# Patient Record
Sex: MALE | Race: WHITE | HISPANIC OR LATINO | Employment: OTHER | URBAN - METROPOLITAN AREA
[De-identification: names, ages, dates, MRNs, and addresses within clinical notes are randomized per-mention and may not be internally consistent; named-entity substitution may affect disease eponyms.]

---

## 2023-04-10 ENCOUNTER — APPOINTMENT (OUTPATIENT)
Dept: PRIMARY CARE | Facility: CLINIC | Age: 68
End: 2023-04-10
Payer: MEDICARE

## 2023-04-10 PROBLEM — E11.9 CONTROLLED DIABETES MELLITUS TYPE II WITHOUT COMPLICATION (MULTI): Status: ACTIVE | Noted: 2023-04-10

## 2023-04-10 PROBLEM — K22.70 ESOPHAGUS, BARRETT'S: Status: ACTIVE | Noted: 2023-04-10

## 2023-04-10 PROBLEM — H25.13 AGE-RELATED NUCLEAR CATARACT OF BOTH EYES: Status: ACTIVE | Noted: 2023-04-10

## 2023-04-10 PROBLEM — E11.9 DIABETES MELLITUS TYPE 2 WITHOUT RETINOPATHY (MULTI): Status: ACTIVE | Noted: 2023-04-10

## 2023-04-10 PROBLEM — J30.2 SEASONAL ALLERGIES: Status: ACTIVE | Noted: 2023-04-10

## 2023-04-10 PROBLEM — E11.9 DIABETES MELLITUS (MULTI): Status: ACTIVE | Noted: 2023-04-10

## 2023-04-10 PROBLEM — K21.9 GERD (GASTROESOPHAGEAL REFLUX DISEASE): Status: ACTIVE | Noted: 2023-04-10

## 2023-04-10 PROBLEM — H52.4 BILATERAL PRESBYOPIA: Status: ACTIVE | Noted: 2023-04-10

## 2023-04-10 PROBLEM — I10 BENIGN ESSENTIAL HYPERTENSION: Status: ACTIVE | Noted: 2023-04-10

## 2023-04-10 PROBLEM — E78.00 HIGH CHOLESTEROL: Status: ACTIVE | Noted: 2023-04-10

## 2023-04-10 RX ORDER — LOSARTAN POTASSIUM AND HYDROCHLOROTHIAZIDE 12.5; 1 MG/1; MG/1
TABLET ORAL
COMMUNITY
End: 2023-05-08 | Stop reason: ALTCHOICE

## 2023-04-10 RX ORDER — SIMVASTATIN 20 MG/1
TABLET, FILM COATED ORAL
COMMUNITY
End: 2023-05-08

## 2023-04-10 RX ORDER — LANSOPRAZOLE 30 MG/1
CAPSULE, DELAYED RELEASE ORAL
COMMUNITY
End: 2023-05-08 | Stop reason: ALTCHOICE

## 2023-04-10 RX ORDER — NEOMYCIN/POLYMYXIN B/PRAMOXINE 3.5-10K-1
CREAM (GRAM) TOPICAL
COMMUNITY
End: 2023-05-08 | Stop reason: ALTCHOICE

## 2023-04-10 RX ORDER — TESTOSTERONE CYPIONATE 1000 MG/10ML
INJECTION, SOLUTION INTRAMUSCULAR
COMMUNITY
End: 2023-05-08 | Stop reason: ALTCHOICE

## 2023-04-10 RX ORDER — LIRAGLUTIDE 6 MG/ML
INJECTION SUBCUTANEOUS
COMMUNITY
End: 2023-05-08 | Stop reason: ALTCHOICE

## 2023-04-10 RX ORDER — METFORMIN HYDROCHLORIDE EXTENDED-RELEASE TABLETS 1000 MG/1
TABLET, FILM COATED, EXTENDED RELEASE ORAL
COMMUNITY
End: 2023-05-08 | Stop reason: SDUPTHER

## 2023-04-10 RX ORDER — PROPRANOLOL HYDROCHLORIDE 40 MG/1
TABLET ORAL
COMMUNITY
End: 2023-05-08 | Stop reason: SDUPTHER

## 2023-05-08 ENCOUNTER — OFFICE VISIT (OUTPATIENT)
Dept: PRIMARY CARE | Facility: CLINIC | Age: 68
End: 2023-05-08
Payer: MEDICARE

## 2023-05-08 VITALS
SYSTOLIC BLOOD PRESSURE: 125 MMHG | RESPIRATION RATE: 16 BRPM | OXYGEN SATURATION: 97 % | HEART RATE: 82 BPM | DIASTOLIC BLOOD PRESSURE: 86 MMHG

## 2023-05-08 DIAGNOSIS — K22.70 BARRETT'S ESOPHAGUS WITHOUT DYSPLASIA: ICD-10-CM

## 2023-05-08 DIAGNOSIS — N40.0 BENIGN PROSTATIC HYPERPLASIA WITHOUT LOWER URINARY TRACT SYMPTOMS: ICD-10-CM

## 2023-05-08 DIAGNOSIS — E78.00 HIGH CHOLESTEROL: ICD-10-CM

## 2023-05-08 DIAGNOSIS — I10 BENIGN ESSENTIAL HYPERTENSION: Primary | ICD-10-CM

## 2023-05-08 DIAGNOSIS — E11.9 CONTROLLED TYPE 2 DIABETES MELLITUS WITHOUT COMPLICATION, WITHOUT LONG-TERM CURRENT USE OF INSULIN (MULTI): ICD-10-CM

## 2023-05-08 PROBLEM — K80.50 CHOLEDOCHOLITHIASIS: Status: RESOLVED | Noted: 2017-09-25 | Resolved: 2023-05-08

## 2023-05-08 PROBLEM — K80.20 CHOLELITHIASIS: Status: RESOLVED | Noted: 2017-09-14 | Resolved: 2023-05-08

## 2023-05-08 PROCEDURE — 4010F ACE/ARB THERAPY RXD/TAKEN: CPT | Performed by: INTERNAL MEDICINE

## 2023-05-08 PROCEDURE — 1160F RVW MEDS BY RX/DR IN RCRD: CPT | Performed by: INTERNAL MEDICINE

## 2023-05-08 PROCEDURE — 1036F TOBACCO NON-USER: CPT | Performed by: INTERNAL MEDICINE

## 2023-05-08 PROCEDURE — 3079F DIAST BP 80-89 MM HG: CPT | Performed by: INTERNAL MEDICINE

## 2023-05-08 PROCEDURE — 99204 OFFICE O/P NEW MOD 45 MIN: CPT | Performed by: INTERNAL MEDICINE

## 2023-05-08 PROCEDURE — 1159F MED LIST DOCD IN RCRD: CPT | Performed by: INTERNAL MEDICINE

## 2023-05-08 PROCEDURE — 3074F SYST BP LT 130 MM HG: CPT | Performed by: INTERNAL MEDICINE

## 2023-05-08 RX ORDER — ALFUZOSIN HYDROCHLORIDE 10 MG/1
10 TABLET, EXTENDED RELEASE ORAL NIGHTLY
COMMUNITY

## 2023-05-08 RX ORDER — DAPAGLIFLOZIN 10 MG/1
10 TABLET, FILM COATED ORAL DAILY
COMMUNITY
Start: 2023-01-11 | End: 2024-05-28 | Stop reason: SDUPTHER

## 2023-05-08 RX ORDER — SEMAGLUTIDE 1.34 MG/ML
2 INJECTION, SOLUTION SUBCUTANEOUS
COMMUNITY
Start: 2022-11-07 | End: 2023-10-06 | Stop reason: SDUPTHER

## 2023-05-08 RX ORDER — METFORMIN HYDROCHLORIDE 500 MG/1
1000 TABLET, EXTENDED RELEASE ORAL
COMMUNITY
Start: 2023-02-02 | End: 2024-05-28 | Stop reason: SDUPTHER

## 2023-05-08 RX ORDER — LOSARTAN POTASSIUM 50 MG/1
50 TABLET ORAL DAILY
COMMUNITY
Start: 2023-05-04

## 2023-05-08 RX ORDER — PROPRANOLOL HYDROCHLORIDE 40 MG/1
40 TABLET ORAL 2 TIMES DAILY
COMMUNITY
Start: 2016-04-04 | End: 2024-06-03

## 2023-05-08 RX ORDER — SIMVASTATIN 40 MG/1
TABLET, FILM COATED ORAL
COMMUNITY
End: 2023-10-06 | Stop reason: SDUPTHER

## 2023-05-08 RX ORDER — CALC/MAG/B COMPLEX/D3/HERB 61
15 TABLET ORAL DAILY
COMMUNITY

## 2023-05-08 RX ORDER — ASPIRIN 81 MG/1
81 TABLET ORAL
COMMUNITY
Start: 2016-04-04 | End: 2024-01-22

## 2023-05-08 RX ORDER — PIOGLITAZONEHYDROCHLORIDE 15 MG/1
15 TABLET ORAL DAILY
COMMUNITY
Start: 2023-04-10 | End: 2023-10-06 | Stop reason: SDUPTHER

## 2023-05-08 ASSESSMENT — ENCOUNTER SYMPTOMS
ABDOMINAL PAIN: 0
SHORTNESS OF BREATH: 0
APPETITE CHANGE: 0
POLYPHAGIA: 0
POLYDIPSIA: 0
DECREASED CONCENTRATION: 0
ACTIVITY CHANGE: 0
COLOR CHANGE: 0
HEADACHES: 0
NERVOUS/ANXIOUS: 0
FEVER: 0
BRUISES/BLEEDS EASILY: 0
NAUSEA: 0
MYALGIAS: 0
RHINORRHEA: 0
JOINT SWELLING: 0
BACK PAIN: 0
HEMATURIA: 0
SLEEP DISTURBANCE: 0
DIAPHORESIS: 0
NUMBNESS: 0
SINUS PRESSURE: 0
FATIGUE: 0
CONSTIPATION: 0
PALPITATIONS: 0
WHEEZING: 0
ADENOPATHY: 0
DIZZINESS: 0
LIGHT-HEADEDNESS: 0
NECK STIFFNESS: 0
EYE ITCHING: 0
ARTHRALGIAS: 0
DIARRHEA: 0
VOMITING: 0
SORE THROAT: 0
DYSURIA: 0
COUGH: 0
WEAKNESS: 0
DYSPHORIC MOOD: 0
CHEST TIGHTNESS: 0
FREQUENCY: 0

## 2023-05-08 NOTE — PROGRESS NOTES
Jakob Dyer comes in today to establish with a new PCP.      Ms. Dyer comes in today to establish with a new primary care physician.  He is followed by specialists for diabetes, BPH, and Ortiz's esophagus.  He has remained quite stable and feels quite well.  Lab work was recently updated.  He was previously followed by an in-house physician as his place of employment, needing now to establish with a new PCP as he has retired.  He is uncertain when he has had a last technical physical, but again just had blood work done and has follow-up with his specialist upcoming, so he would like to defer this until the end of the year.  He feels well.  He denies any headaches, dizziness, chest pain, palpitations, shortness of breath nor cough, nausea, vomiting, nor changes in bowel nor bladder habits.  Again, he follows closely with specialist, his levels being well managed.  He tries to stay active, traveling, and exercising routinely.  He denies any problems to bring up at this time.        Review of Systems   Constitutional:  Negative for activity change, appetite change, diaphoresis, fatigue and fever.   HENT:  Negative for congestion, ear pain, rhinorrhea, sinus pressure, sore throat and tinnitus.    Eyes:  Negative for itching and visual disturbance.   Respiratory:  Negative for cough, chest tightness, shortness of breath and wheezing.    Cardiovascular:  Negative for chest pain, palpitations and leg swelling.   Gastrointestinal:  Negative for abdominal pain, constipation, diarrhea, nausea and vomiting.   Endocrine: Negative for cold intolerance, polydipsia, polyphagia and polyuria.   Genitourinary:  Negative for dysuria, frequency, hematuria, testicular pain and urgency.   Musculoskeletal:  Negative for arthralgias, back pain, joint swelling, myalgias and neck stiffness.   Skin:  Negative for color change, pallor and rash.   Allergic/Immunologic: Negative for environmental allergies, food allergies and  immunocompromised state.   Neurological:  Negative for dizziness, syncope, weakness, light-headedness, numbness and headaches.   Hematological:  Negative for adenopathy. Does not bruise/bleed easily.   Psychiatric/Behavioral:  Negative for behavioral problems, decreased concentration, dysphoric mood and sleep disturbance. The patient is not nervous/anxious.        Objective   Physical Exam  Constitutional:       General: He is not in acute distress.     Appearance: Normal appearance. He is not toxic-appearing or diaphoretic.   HENT:      Head: Normocephalic and atraumatic.      Right Ear: Tympanic membrane normal. There is no impacted cerumen.      Left Ear: Tympanic membrane normal. There is no impacted cerumen.   Eyes:      General: No scleral icterus.        Right eye: No discharge.         Left eye: No discharge.      Conjunctiva/sclera: Conjunctivae normal.      Pupils: Pupils are equal, round, and reactive to light.   Neck:      Vascular: No carotid bruit.   Cardiovascular:      Rate and Rhythm: Normal rate and regular rhythm.      Heart sounds: Normal heart sounds. No murmur heard.     No gallop.   Pulmonary:      Effort: Pulmonary effort is normal. No respiratory distress.      Breath sounds: Normal breath sounds. No wheezing, rhonchi or rales.   Abdominal:      General: There is no distension.      Tenderness: There is no abdominal tenderness. There is no guarding or rebound.   Musculoskeletal:         General: No swelling.      Cervical back: No rigidity.      Right lower leg: No edema.      Left lower leg: No edema.   Lymphadenopathy:      Cervical: No cervical adenopathy.   Skin:     Coloration: Skin is not jaundiced.      Findings: No erythema or rash.   Neurological:      General: No focal deficit present.      Mental Status: He is alert. He is disoriented.      Cranial Nerves: No cranial nerve deficit.      Sensory: No sensory deficit.   Psychiatric:         Mood and Affect: Mood normal.          Behavior: Behavior normal.         Thought Content: Thought content normal.         Judgment: Judgment normal.         Assessment/Plan   1.  Diabetes: Follows closely with endocrinologist at an outside facility.  Had lab work done about 1 week ago, plans on following up with him in the next few weeks.  All adjustments on refills on medications are coming through the specialist office.  2.  Hypertension: Has remained outstanding.  Denies any troubles tolerating his losartan.  Had actually decreased his dosing recently because of managed readings.  3.  Mild hyperlipidemia: Continues on statin.  Lab work recently updated.  Follows with endocrinology.  4.  BPH: Stable symptoms on current medications.  Again, follows with urology specialist.  5.  Ortiz's esophagus with GERD: Ortiz's reportedly had essentially been fully treated based on last scope.  Continues on PPI therapy now at a lower dose.    We will plan on seeing him back in the late year for his wellness visit.  If he has any questions prior to that time, he is more than welcome to contact us.    Problem List Items Addressed This Visit    None

## 2023-05-08 NOTE — PATIENT INSTRUCTIONS
It was a pleasure meeting you in the office today.  At this time, your specialists are managing your prescriptions and following your blood work, but we should plan on seeing you back at the end of the year for a comprehensive Medicare wellness visit.  If you have any questions prior to that time, please feel free to contact us.

## 2023-09-04 PROBLEM — K76.0 FATTY LIVER DISEASE, NONALCOHOLIC: Status: ACTIVE | Noted: 2023-09-04

## 2023-09-04 PROBLEM — N52.9 ERECTILE DYSFUNCTION: Status: ACTIVE | Noted: 2023-09-04

## 2023-09-04 PROBLEM — E11.65 TYPE 2 DIABETES MELLITUS WITH HYPERGLYCEMIA (MULTI): Status: ACTIVE | Noted: 2023-09-04

## 2023-09-04 RX ORDER — PEN NEEDLE, DIABETIC 30 GX3/16"
NEEDLE, DISPOSABLE MISCELLANEOUS DAILY
COMMUNITY

## 2023-10-06 DIAGNOSIS — E11.9 DIABETES MELLITUS TYPE 2 WITHOUT RETINOPATHY (MULTI): Primary | ICD-10-CM

## 2023-10-06 RX ORDER — SEMAGLUTIDE 1.34 MG/ML
2 INJECTION, SOLUTION SUBCUTANEOUS
Qty: 18 ML | Refills: 3 | Status: SHIPPED | OUTPATIENT
Start: 2023-10-06 | End: 2023-11-07 | Stop reason: SDUPTHER

## 2023-10-06 RX ORDER — SIMVASTATIN 40 MG/1
TABLET, FILM COATED ORAL
Qty: 90 TABLET | Refills: 1 | Status: SHIPPED | OUTPATIENT
Start: 2023-10-06 | End: 2024-03-29

## 2023-10-06 RX ORDER — PIOGLITAZONEHYDROCHLORIDE 15 MG/1
15 TABLET ORAL DAILY
Qty: 90 TABLET | Refills: 3 | Status: SHIPPED | OUTPATIENT
Start: 2023-10-06 | End: 2024-10-05

## 2023-11-07 ENCOUNTER — TELEPHONE (OUTPATIENT)
Dept: ENDOCRINOLOGY | Facility: CLINIC | Age: 68
End: 2023-11-07
Payer: MEDICARE

## 2023-11-07 DIAGNOSIS — E11.9 DIABETES MELLITUS TYPE 2 WITHOUT RETINOPATHY (MULTI): ICD-10-CM

## 2023-11-07 RX ORDER — SEMAGLUTIDE 1.34 MG/ML
2 INJECTION, SOLUTION SUBCUTANEOUS
Qty: 18 ML | Refills: 3 | Status: SHIPPED | OUTPATIENT
Start: 2023-11-07 | End: 2024-04-22 | Stop reason: WASHOUT

## 2023-11-07 NOTE — TELEPHONE ENCOUNTER
Pt states that he has been having GI problems from Ozempic. He would like to know if there is another Rx like Ozempic that he could take?

## 2023-11-13 ENCOUNTER — OFFICE VISIT (OUTPATIENT)
Dept: PRIMARY CARE | Facility: CLINIC | Age: 68
End: 2023-11-13
Payer: MEDICARE

## 2023-11-13 VITALS
HEIGHT: 72 IN | HEART RATE: 84 BPM | BODY MASS INDEX: 28.61 KG/M2 | SYSTOLIC BLOOD PRESSURE: 95 MMHG | WEIGHT: 211.2 LBS | DIASTOLIC BLOOD PRESSURE: 66 MMHG

## 2023-11-13 DIAGNOSIS — K21.9 GASTROESOPHAGEAL REFLUX DISEASE, UNSPECIFIED WHETHER ESOPHAGITIS PRESENT: ICD-10-CM

## 2023-11-13 DIAGNOSIS — Z00.00 ROUTINE GENERAL MEDICAL EXAMINATION AT HEALTH CARE FACILITY: ICD-10-CM

## 2023-11-13 DIAGNOSIS — Z23 NEED FOR PNEUMOCOCCAL 20-VALENT CONJUGATE VACCINATION: ICD-10-CM

## 2023-11-13 DIAGNOSIS — I10 BENIGN ESSENTIAL HYPERTENSION: ICD-10-CM

## 2023-11-13 DIAGNOSIS — N40.0 BENIGN PROSTATIC HYPERPLASIA WITHOUT LOWER URINARY TRACT SYMPTOMS: ICD-10-CM

## 2023-11-13 DIAGNOSIS — E11.9 CONTROLLED TYPE 2 DIABETES MELLITUS WITHOUT COMPLICATION, WITHOUT LONG-TERM CURRENT USE OF INSULIN (MULTI): ICD-10-CM

## 2023-11-13 DIAGNOSIS — Z12.5 SCREENING FOR MALIGNANT NEOPLASM OF PROSTATE: ICD-10-CM

## 2023-11-13 DIAGNOSIS — Z23 NEED FOR VACCINATION TO PREVENT TUBERCULOSIS: ICD-10-CM

## 2023-11-13 DIAGNOSIS — Z00.00 ROUTINE GENERAL MEDICAL EXAMINATION AT A HEALTH CARE FACILITY: Primary | ICD-10-CM

## 2023-11-13 DIAGNOSIS — Z13.6 ENCOUNTER FOR ABDOMINAL AORTIC ANEURYSM (AAA) SCREENING: ICD-10-CM

## 2023-11-13 DIAGNOSIS — E78.00 HIGH CHOLESTEROL: ICD-10-CM

## 2023-11-13 PROCEDURE — G0009 ADMIN PNEUMOCOCCAL VACCINE: HCPCS | Performed by: INTERNAL MEDICINE

## 2023-11-13 PROCEDURE — 90677 PCV20 VACCINE IM: CPT | Performed by: INTERNAL MEDICINE

## 2023-11-13 PROCEDURE — G0439 PPPS, SUBSEQ VISIT: HCPCS | Performed by: INTERNAL MEDICINE

## 2023-11-13 PROCEDURE — 1170F FXNL STATUS ASSESSED: CPT | Performed by: INTERNAL MEDICINE

## 2023-11-13 PROCEDURE — 3078F DIAST BP <80 MM HG: CPT | Performed by: INTERNAL MEDICINE

## 2023-11-13 PROCEDURE — 1036F TOBACCO NON-USER: CPT | Performed by: INTERNAL MEDICINE

## 2023-11-13 PROCEDURE — 93000 ELECTROCARDIOGRAM COMPLETE: CPT | Performed by: INTERNAL MEDICINE

## 2023-11-13 PROCEDURE — 3074F SYST BP LT 130 MM HG: CPT | Performed by: INTERNAL MEDICINE

## 2023-11-13 PROCEDURE — 4010F ACE/ARB THERAPY RXD/TAKEN: CPT | Performed by: INTERNAL MEDICINE

## 2023-11-13 PROCEDURE — 99214 OFFICE O/P EST MOD 30 MIN: CPT | Performed by: INTERNAL MEDICINE

## 2023-11-13 PROCEDURE — 1159F MED LIST DOCD IN RCRD: CPT | Performed by: INTERNAL MEDICINE

## 2023-11-13 PROCEDURE — 1160F RVW MEDS BY RX/DR IN RCRD: CPT | Performed by: INTERNAL MEDICINE

## 2023-11-13 ASSESSMENT — ENCOUNTER SYMPTOMS
WEAKNESS: 0
HEMATURIA: 0
CHEST TIGHTNESS: 0
OCCASIONAL FEELINGS OF UNSTEADINESS: 0
FEVER: 0
FLANK PAIN: 0
NERVOUS/ANXIOUS: 0
ARTHRALGIAS: 0
HYPERACTIVE: 0
ABDOMINAL PAIN: 0
BACK PAIN: 0
ADENOPATHY: 0
SORE THROAT: 0
DEPRESSION: 0
CONSTIPATION: 0
PALPITATIONS: 0
NECK PAIN: 0
COLOR CHANGE: 0
ABDOMINAL DISTENTION: 0
LOSS OF SENSATION IN FEET: 0
NECK STIFFNESS: 0
SINUS PAIN: 0
SHORTNESS OF BREATH: 0
ACTIVITY CHANGE: 0
FREQUENCY: 1
DIARRHEA: 0
HEADACHES: 0
DYSPHORIC MOOD: 0
WHEEZING: 0
CHILLS: 0
EYE DISCHARGE: 0
DECREASED CONCENTRATION: 0
DYSURIA: 0
VOICE CHANGE: 0
FATIGUE: 0
BRUISES/BLEEDS EASILY: 0
RHINORRHEA: 0
VOMITING: 0
COUGH: 0
NAUSEA: 0
EYE ITCHING: 0
SINUS PRESSURE: 0
SLEEP DISTURBANCE: 0
DIZZINESS: 0
MYALGIAS: 0
LIGHT-HEADEDNESS: 0

## 2023-11-13 ASSESSMENT — ACTIVITIES OF DAILY LIVING (ADL)
TAKING_MEDICATION: INDEPENDENT
BATHING: INDEPENDENT
GROCERY_SHOPPING: INDEPENDENT
DRESSING: INDEPENDENT
DOING_HOUSEWORK: INDEPENDENT
MANAGING_FINANCES: INDEPENDENT

## 2023-11-13 ASSESSMENT — PATIENT HEALTH QUESTIONNAIRE - PHQ9
2. FEELING DOWN, DEPRESSED OR HOPELESS: NOT AT ALL
SUM OF ALL RESPONSES TO PHQ9 QUESTIONS 1 AND 2: 0
1. LITTLE INTEREST OR PLEASURE IN DOING THINGS: NOT AT ALL

## 2023-11-13 NOTE — PATIENT INSTRUCTIONS
At a time that is convenient for you, please obtain your bloodwork on a fasting basis.  We will then follow up on these results once available.  Colonoscopy appears up-to-date from 2022, likely to be repeated in 5 years total.  Please consider scheduling an ultrasounds for AAA screening.  You have received your pneumonia vaccine today.  Thank you for keeping up with the rest of your routine vaccinations.  Please keep close follow-up with your specialist.  If you have any questions or need additional refills, please feel free to let us know.  Otherwise, we are happy to see you back in 6 months for brief follow-up.

## 2023-11-13 NOTE — PROGRESS NOTES
Subjective   Reason for Visit: Jakob Dyer is an 67 y.o. male patient comes in today for comprehensive follow-up of medical conditions in conjunction with annual wellness visit    Mr. Dyer comes in today for comprehensive follow-up of medical conditions in conjunction with annual wellness visit, dictated in a separate note.  Please refer to that note for details on healthcare maintenance and screening studies.    Mr. Dyer comes in today for comprehensive follow-up.  He is overall feeling quite well.  He denies any specific concerns or troubles.  He follows with his endocrinologist regularly as well as his urologist.  He did have some troubles tolerating his higher dose of injectable therapy, but with the lower dose he is tolerating this well.  He has follow-up with endocrine in December.  He denies any specific concerns of headaches, dizziness, chest pain or palpitations, shortness of breath nor cough, nausea, vomiting, nor changes in bowel nor bladder habits.  He plans on having lab work done in conjunction with his visit for endocrine in December.  He would like to update any necessary healthcare maintenance studies and immunizations as may be appropriate.  He feels well in general, just in for his wellness visit and follow up.        Patient Care Team:  Akiko Killian MD as PCP - General (Internal Medicine)     Review of Systems   Constitutional:  Negative for activity change, chills, fatigue and fever.   HENT:  Positive for hearing loss. Negative for congestion, ear pain, postnasal drip, rhinorrhea, sinus pressure, sinus pain, sore throat, tinnitus and voice change.    Eyes:  Negative for discharge, itching and visual disturbance.   Respiratory:  Negative for cough, chest tightness, shortness of breath and wheezing.    Cardiovascular:  Negative for chest pain, palpitations and leg swelling.   Gastrointestinal:  Negative for abdominal distention, abdominal pain, constipation, diarrhea, nausea and vomiting.    Endocrine: Negative for cold intolerance, heat intolerance and polyuria.   Genitourinary:  Positive for frequency. Negative for dysuria, flank pain, hematuria and urgency.   Musculoskeletal:  Negative for arthralgias, back pain, myalgias, neck pain and neck stiffness.   Skin:  Negative for color change, pallor and rash.   Allergic/Immunologic: Negative for environmental allergies, food allergies and immunocompromised state.   Neurological:  Negative for dizziness, syncope, weakness, light-headedness and headaches.   Hematological:  Negative for adenopathy. Does not bruise/bleed easily.   Psychiatric/Behavioral:  Negative for behavioral problems, decreased concentration, dysphoric mood and sleep disturbance. The patient is not nervous/anxious and is not hyperactive.        Objective   Vitals:  BP 95/66   Pulse 84   Ht 1.829 m (6')   Wt 95.8 kg (211 lb 3.2 oz)   BMI 28.64 kg/m²       Physical Exam  Constitutional:       General: He is not in acute distress.     Appearance: Normal appearance. He is normal weight. He is not diaphoretic.   HENT:      Head: Normocephalic and atraumatic.      Right Ear: Tympanic membrane normal. There is no impacted cerumen.      Left Ear: Tympanic membrane normal. There is no impacted cerumen.      Nose: Nose normal. No congestion.      Mouth/Throat:      Mouth: Mucous membranes are dry.      Pharynx: No oropharyngeal exudate or posterior oropharyngeal erythema.   Eyes:      General: No scleral icterus.     Conjunctiva/sclera: Conjunctivae normal.      Pupils: Pupils are equal, round, and reactive to light.   Neck:      Vascular: No carotid bruit.   Cardiovascular:      Rate and Rhythm: Normal rate and regular rhythm.      Pulses: Normal pulses.      Heart sounds: Normal heart sounds. No murmur heard.     No gallop.   Pulmonary:      Effort: Pulmonary effort is normal. No respiratory distress.      Breath sounds: Normal breath sounds. No wheezing, rhonchi or rales.   Abdominal:       General: There is no distension.      Tenderness: There is no abdominal tenderness. There is no guarding or rebound.      Hernia: A hernia (ventral and umbilical hernia, reducible) is present.   Genitourinary:     Comments: Deferred to urology  Musculoskeletal:         General: No swelling or deformity.      Cervical back: Normal range of motion and neck supple. No rigidity.      Right lower leg: No edema.      Left lower leg: No edema.   Lymphadenopathy:      Cervical: No cervical adenopathy.   Skin:     Coloration: Skin is not jaundiced.      Findings: No erythema, lesion or rash.   Neurological:      General: No focal deficit present.      Mental Status: He is alert.      Cranial Nerves: No cranial nerve deficit.      Motor: No weakness.      Gait: Gait normal.      Deep Tendon Reflexes: Reflexes normal.   Psychiatric:         Mood and Affect: Mood normal.         Behavior: Behavior normal.         Thought Content: Thought content normal.         Judgment: Judgment normal.         Assessment/Plan   Full age-appropriate comprehensive physical exam and health care maintenance performed and discussed today.  Routine safety and preventative measures discussed including self testicular exam, seatbelt use, no drinking and driving, no texting and driving, abstinence or cessation of tobacco use, routine dental and vision exams, healthy diet and regular exercise.    We will update comprehensive labs and follow-up on results once available.  He will have these done next month in preparation for his endocrinology follow-up.  Needs baseline AAA screening.  Requisition placed.  Colonoscopy up-to-date from 2022 with a tubular adenoma, reportedly repeat in 5 years.  EKG normal as above.  Has completed shingles vaccine series.  Has recently received RSV vaccine, COVID booster, and annual flu shot.  Prevnar 20 provided today.  Tetanus up-to-date from earlier 2023.    In addition to the above-mentioned healthcare maintenance and  screening studies, the following were discussed and assessed in detail today:  1.  Diabetes: Follows closely with endocrine specialist.  Recently lower dose on his Ozempic, because of GI intolerance.  Doing well on this currently.  Update labs next month with his visit.  2.  Hyperlipidemia: Due for updated comprehensive labs, plans on doing next month.  Contact us if refills needed.  3.  Hypertension: Outstanding control.  Could consider lowering losartan further, tolerating well currently.  4.  BPH: Follows closely with urology specialist.  Doing well on current medication management.  5.  GERD: Stable on over-the-counter dosing of lansoprazole.    Happy to see him back in 6 months for brief follow-up.  He is to contact us with any questions prior to that time.  Problem List Items Addressed This Visit    None  Visit Diagnoses       Routine general medical examination at a health care facility    -  Primary    Relevant Orders    ECG 12 lead (Clinic Performed)    Routine general medical examination at health care facility

## 2023-11-22 ENCOUNTER — HOSPITAL ENCOUNTER (OUTPATIENT)
Dept: VASCULAR MEDICINE | Facility: HOSPITAL | Age: 68
Discharge: HOME | End: 2023-11-22
Payer: MEDICARE

## 2023-11-22 DIAGNOSIS — Z00.00 ROUTINE GENERAL MEDICAL EXAMINATION AT A HEALTH CARE FACILITY: ICD-10-CM

## 2023-11-22 DIAGNOSIS — Z13.6 ENCOUNTER FOR ABDOMINAL AORTIC ANEURYSM (AAA) SCREENING: ICD-10-CM

## 2023-11-22 PROCEDURE — 76706 US ABDL AORTA SCREEN AAA: CPT | Performed by: SURGERY

## 2023-11-22 PROCEDURE — 76706 US ABDL AORTA SCREEN AAA: CPT

## 2023-12-20 ENCOUNTER — APPOINTMENT (OUTPATIENT)
Dept: ENDOCRINOLOGY | Facility: CLINIC | Age: 68
End: 2023-12-20
Payer: MEDICARE

## 2024-01-22 ENCOUNTER — OFFICE VISIT (OUTPATIENT)
Dept: ENDOCRINOLOGY | Facility: CLINIC | Age: 69
End: 2024-01-22
Payer: MEDICARE

## 2024-01-22 VITALS
SYSTOLIC BLOOD PRESSURE: 108 MMHG | WEIGHT: 215.8 LBS | DIASTOLIC BLOOD PRESSURE: 70 MMHG | HEART RATE: 96 BPM | BODY MASS INDEX: 29.27 KG/M2

## 2024-01-22 DIAGNOSIS — K76.0 FATTY LIVER DISEASE, NONALCOHOLIC: ICD-10-CM

## 2024-01-22 DIAGNOSIS — I10 BENIGN ESSENTIAL HYPERTENSION: ICD-10-CM

## 2024-01-22 DIAGNOSIS — E11.9 DIABETES MELLITUS TYPE 2 WITHOUT RETINOPATHY (MULTI): Primary | ICD-10-CM

## 2024-01-22 DIAGNOSIS — E78.00 HIGH CHOLESTEROL: ICD-10-CM

## 2024-01-22 LAB — POC HEMOGLOBIN A1C: 7.8 % (ref 4.2–6.5)

## 2024-01-22 PROCEDURE — 99214 OFFICE O/P EST MOD 30 MIN: CPT | Performed by: INTERNAL MEDICINE

## 2024-01-22 PROCEDURE — 1160F RVW MEDS BY RX/DR IN RCRD: CPT | Performed by: INTERNAL MEDICINE

## 2024-01-22 PROCEDURE — 83036 HEMOGLOBIN GLYCOSYLATED A1C: CPT | Performed by: INTERNAL MEDICINE

## 2024-01-22 PROCEDURE — 1159F MED LIST DOCD IN RCRD: CPT | Performed by: INTERNAL MEDICINE

## 2024-01-22 PROCEDURE — 4010F ACE/ARB THERAPY RXD/TAKEN: CPT | Performed by: INTERNAL MEDICINE

## 2024-01-22 PROCEDURE — 3074F SYST BP LT 130 MM HG: CPT | Performed by: INTERNAL MEDICINE

## 2024-01-22 PROCEDURE — 3078F DIAST BP <80 MM HG: CPT | Performed by: INTERNAL MEDICINE

## 2024-01-22 PROCEDURE — 1036F TOBACCO NON-USER: CPT | Performed by: INTERNAL MEDICINE

## 2024-01-22 PROCEDURE — 1126F AMNT PAIN NOTED NONE PRSNT: CPT | Performed by: INTERNAL MEDICINE

## 2024-01-22 RX ORDER — GLIMEPIRIDE 2 MG/1
2 TABLET ORAL
Qty: 90 TABLET | Refills: 1 | Status: SHIPPED | OUTPATIENT
Start: 2024-01-22 | End: 2025-01-21

## 2024-01-22 ASSESSMENT — ENCOUNTER SYMPTOMS
DEPRESSION: 0
LOSS OF SENSATION IN FEET: 0
OCCASIONAL FEELINGS OF UNSTEADINESS: 0

## 2024-01-22 ASSESSMENT — PATIENT HEALTH QUESTIONNAIRE - PHQ9
2. FEELING DOWN, DEPRESSED OR HOPELESS: NOT AT ALL
1. LITTLE INTEREST OR PLEASURE IN DOING THINGS: NOT AT ALL
SUM OF ALL RESPONSES TO PHQ9 QUESTIONS 1 & 2: 0

## 2024-01-22 ASSESSMENT — PAIN SCALES - GENERAL: PAINLEVEL: 0-NO PAIN

## 2024-01-22 NOTE — PROGRESS NOTES
"HPI   67 yo with Diabetes 2 (dx in mid 50's) , HTN, Dyslipidemia, ED, Fatty liver dz, raven's esophagus presents for followup. Last A1C- 6.4%, today 7.8%.           Pt is testing sugars <1 times per day. Pt is having low sugars 0 times/week. Pt's typical blood sugars are running mid 100's often in afternoon,  still not testing at other times of day. Pt is struggling following a carb controlled diet and knows reasonable carb allowances. Pt is able to afford their medication for the most part. Pt is not as active recently.           Taking metformin 500mg ER (2 is what he tolerates),  farxiga 10mg (pt is cutting in 1/2 to help with cost), adri 15mg, ozempic 1mg weekly (having gi effects on 2 mg dose) . No longer taking glimepiride.   -cost is an issue as pt on medicare           Taking losartan 50mg and propranolol for htn.           Taking simvastatin 40mg qhs for lipids and tolerating.    -recently having some abdominal bloating/gassy after last ozempic injection    -labs ordered last visit, pt didn't do, has orders from pcp      Current Outpatient Medications:     alfuzosin (Uroxatral) 10 mg 24 hr tablet, Take 1 tablet (10 mg) by mouth once daily at bedtime., Disp: , Rfl:     Farxiga 10 mg, Take 1 tablet (10 mg) by mouth once daily., Disp: , Rfl:     lansoprazole (Prevacid) 15 mg DR capsule, Take 1 capsule (15 mg) by mouth once daily., Disp: , Rfl:     losartan (Cozaar) 50 mg tablet, Take 1 tablet (50 mg) by mouth once daily., Disp: , Rfl:     metFORMIN XR (Glucophage-XR) 500 mg 24 hr tablet, Take 2 tablets (1,000 mg) by mouth once daily in the evening. Take with meals., Disp: , Rfl:     Ozempic 1 mg/dose (4 mg/3 mL) pen injector, Inject 2 mg under the skin 1 (one) time per week., Disp: 18 mL, Rfl: 3    pen needle, diabetic 32 gauge x 5/32\" needle, once daily. For 90, Disp: , Rfl:     pioglitazone (Actos) 15 mg tablet, Take 1 tablet (15 mg) by mouth once daily., Disp: 90 tablet, Rfl: 3    propranolol (Inderal) 40 " "mg tablet, Take 1 tablet (40 mg) by mouth twice a day., Disp: , Rfl:     simvastatin (Zocor) 40 mg tablet, TAKE 1 TABLET BY MOUTH EVERY DAY IN THE EVENING FOR 90 DAYS, Disp: 90 tablet, Rfl: 1      Allergies as of 01/22/2024    (No Known Allergies)         Review of Systems   Cardiology: Lightheadedness-denies.  Chest pain-denies.  Leg edema-denies.  Palpitations-denies.  Respiratory: Cough-denies. Shortness of breath-denies.  Wheezing-denies.  Gastroenterology: Constipation-denies.  Diarrhea-denies.  Heartburn-controlled on meds.  Endocrinology: Cold intolerance-denies.  Heat intolerance-denies.  Sweats-denies.  Neurology: Headache-denies.  Tremor-denies.  Neuropathy in extremities-denies.  Psychology: Low energy-denies.  Irritability-denies.  Sleep disturbances-denies.      /70 (BP Location: Left arm)   Pulse 96   Wt 97.9 kg (215 lb 12.8 oz)   BMI 29.27 kg/m²       Labs:  Lab Results   Component Value Date    WBC 8.4 03/23/2023    NRBC 0 03/23/2023    RBC 4.88 03/23/2023    HGB 15.6 03/23/2023    HCT 44.9 03/23/2023     03/23/2023     Lab Results   Component Value Date    CALCIUM 9.1 03/23/2023    AST 17 03/23/2023    ALKPHOS 100 03/23/2023    BILITOT 1.0 03/23/2023    PROT 6.9 03/23/2023    ALBUMIN 3.9 03/23/2023    GLOB 3.0 03/23/2023    AGR 1.3 (L) 03/23/2023     03/23/2023    K 4.7 03/23/2023     03/23/2023    CO2 27 03/23/2023    ANIONGAP 13 03/23/2023    BUN 10 03/23/2023    CREATININE 0.8 03/23/2023    UREACREAUR 12.5 03/23/2023    GLUCOSE 114 (H) 03/23/2023    ALT 28 03/23/2023    EGFR 96 03/23/2023     Lab Results   Component Value Date    CHOL 106 (L) 03/23/2023    TRIG 124 03/23/2023    HDL 38 (L) 03/23/2023    LDLCALC 43 (L) 03/23/2023     Lab Results   Component Value Date    MICROALBCREA 12.8 03/23/2023     No results found for: \"TSH\"  Lab Results   Component Value Date    XNSQLMGL96 369 03/23/2023     Lab Results   Component Value Date    HGBA1C 7.8 (A) 01/22/2024 "         Physical Exam   General Appearance: pleasant, cooperative, no acute distress  HEENT: no chemosis, no proptosis, no lid lag, no lid retraction  Neck: no lymphadenopathy, no thyromegaly, no dominant thyroid nodules  Heart: no murmurs, regular rate and rhythm, S1 and S2  Lungs: no wheezes, no rhonci, no rales  Extremities: no lower extremity swelling      Assessment/Plan   1. Diabetes mellitus type 2 without retinopathy (CMS/HCC)  -A1c ordered and reviewed  -glycemic values reviewed  -labs pending (pt has with pcp, he did not repeat order I had given him)    -start exercising again daily  -please test daily at different times of day  -recent gi sx on ozempic, hold for 2 weeks, restart at 0.5mg dosage as tolerated  -restart glimepiride 2mg qam, use 1/2 dose or stop if going low    2. High cholesterol  -on statin and tolerating, await labs    3. Fatty liver disease, nonalcoholic  -tzd/glp-1/wt loss have helped this, continue to monitor, labs pending    4. HTN  -at target on arb, if lowers much more may need to lower losartan         Follow Up:  Kaity, pharmD 3 months (look into  pharmacy assistance on meds)    Medical Decision Making  Complexity of problem: Chronic illness of diabetes mellitus uncontrolled, progressing  Data analyzed and reviewed: Reviewed prior notes, blood glucose data, labs including HgbA1c, lipids, serum chemistries.  Ordered tests.   Risk of complications and morbidities: Is definite because of use of insulin and risk of hypoglycemia.  Prescription medications reviewed and modifications made.  Compliance assessed.  Addressed social determinants of health including food insecurity.

## 2024-03-29 DIAGNOSIS — E11.9 DIABETES MELLITUS TYPE 2 WITHOUT RETINOPATHY (MULTI): ICD-10-CM

## 2024-03-29 RX ORDER — SIMVASTATIN 40 MG/1
TABLET, FILM COATED ORAL
Qty: 90 TABLET | Refills: 3 | Status: SHIPPED | OUTPATIENT
Start: 2024-03-29

## 2024-04-17 ENCOUNTER — LAB (OUTPATIENT)
Dept: LAB | Facility: LAB | Age: 69
End: 2024-04-17
Payer: MEDICARE

## 2024-04-17 DIAGNOSIS — N40.0 BENIGN PROSTATIC HYPERPLASIA WITHOUT LOWER URINARY TRACT SYMPTOMS: ICD-10-CM

## 2024-04-17 DIAGNOSIS — Z12.5 SCREENING FOR MALIGNANT NEOPLASM OF PROSTATE: ICD-10-CM

## 2024-04-17 DIAGNOSIS — K21.9 GASTROESOPHAGEAL REFLUX DISEASE, UNSPECIFIED WHETHER ESOPHAGITIS PRESENT: ICD-10-CM

## 2024-04-17 DIAGNOSIS — I10 BENIGN ESSENTIAL HYPERTENSION: ICD-10-CM

## 2024-04-17 DIAGNOSIS — E11.9 CONTROLLED TYPE 2 DIABETES MELLITUS WITHOUT COMPLICATION, WITHOUT LONG-TERM CURRENT USE OF INSULIN (MULTI): ICD-10-CM

## 2024-04-17 DIAGNOSIS — E78.00 HIGH CHOLESTEROL: ICD-10-CM

## 2024-04-17 LAB
25(OH)D3 SERPL-MCNC: 24 NG/ML (ref 30–100)
ALBUMIN SERPL BCP-MCNC: 4.1 G/DL (ref 3.4–5)
ALP SERPL-CCNC: 99 U/L (ref 33–136)
ALT SERPL W P-5'-P-CCNC: 33 U/L (ref 10–52)
ANION GAP SERPL CALC-SCNC: 14 MMOL/L (ref 10–20)
APPEARANCE UR: CLEAR
AST SERPL W P-5'-P-CCNC: 19 U/L (ref 9–39)
BASOPHILS # BLD AUTO: 0.03 X10*3/UL (ref 0–0.1)
BASOPHILS NFR BLD AUTO: 0.5 %
BILIRUB SERPL-MCNC: 1.4 MG/DL (ref 0–1.2)
BILIRUB UR STRIP.AUTO-MCNC: NEGATIVE MG/DL
BUN SERPL-MCNC: 16 MG/DL (ref 6–23)
CALCIUM SERPL-MCNC: 9.6 MG/DL (ref 8.6–10.6)
CHLORIDE SERPL-SCNC: 100 MMOL/L (ref 98–107)
CHOLEST SERPL-MCNC: 122 MG/DL (ref 0–199)
CHOLESTEROL/HDL RATIO: 3.2
CK SERPL-CCNC: 45 U/L (ref 0–325)
CO2 SERPL-SCNC: 30 MMOL/L (ref 21–32)
COLOR UR: ABNORMAL
CREAT SERPL-MCNC: 0.92 MG/DL (ref 0.5–1.3)
CREAT UR-MCNC: 107.5 MG/DL (ref 20–370)
EGFRCR SERPLBLD CKD-EPI 2021: 90 ML/MIN/1.73M*2
EOSINOPHIL # BLD AUTO: 0.15 X10*3/UL (ref 0–0.7)
EOSINOPHIL NFR BLD AUTO: 2.4 %
ERYTHROCYTE [DISTWIDTH] IN BLOOD BY AUTOMATED COUNT: 11.9 % (ref 11.5–14.5)
EST. AVERAGE GLUCOSE BLD GHB EST-MCNC: 143 MG/DL
GLUCOSE SERPL-MCNC: 198 MG/DL (ref 74–99)
GLUCOSE UR STRIP.AUTO-MCNC: ABNORMAL MG/DL
HBA1C MFR BLD: 6.6 %
HCT VFR BLD AUTO: 45.7 % (ref 41–52)
HDLC SERPL-MCNC: 38.2 MG/DL
HGB BLD-MCNC: 15.8 G/DL (ref 13.5–17.5)
IMM GRANULOCYTES # BLD AUTO: 0.01 X10*3/UL (ref 0–0.7)
IMM GRANULOCYTES NFR BLD AUTO: 0.2 % (ref 0–0.9)
KETONES UR STRIP.AUTO-MCNC: NEGATIVE MG/DL
LDLC SERPL CALC-MCNC: 54 MG/DL
LEUKOCYTE ESTERASE UR QL STRIP.AUTO: NEGATIVE
LYMPHOCYTES # BLD AUTO: 1.57 X10*3/UL (ref 1.2–4.8)
LYMPHOCYTES NFR BLD AUTO: 24.7 %
MCH RBC QN AUTO: 31.5 PG (ref 26–34)
MCHC RBC AUTO-ENTMCNC: 34.6 G/DL (ref 32–36)
MCV RBC AUTO: 91 FL (ref 80–100)
MICROALBUMIN UR-MCNC: <7 MG/L
MICROALBUMIN/CREAT UR: NORMAL MG/G{CREAT}
MONOCYTES # BLD AUTO: 0.62 X10*3/UL (ref 0.1–1)
MONOCYTES NFR BLD AUTO: 9.8 %
NEUTROPHILS # BLD AUTO: 3.97 X10*3/UL (ref 1.2–7.7)
NEUTROPHILS NFR BLD AUTO: 62.4 %
NITRITE UR QL STRIP.AUTO: NEGATIVE
NON HDL CHOLESTEROL: 84 MG/DL (ref 0–149)
NRBC BLD-RTO: 0 /100 WBCS (ref 0–0)
PH UR STRIP.AUTO: 5 [PH]
PLATELET # BLD AUTO: 286 X10*3/UL (ref 150–450)
POTASSIUM SERPL-SCNC: 4.8 MMOL/L (ref 3.5–5.3)
PROT SERPL-MCNC: 7.1 G/DL (ref 6.4–8.2)
PROT UR STRIP.AUTO-MCNC: NEGATIVE MG/DL
PSA SERPL-MCNC: 5.33 NG/ML
RBC # BLD AUTO: 5.02 X10*6/UL (ref 4.5–5.9)
RBC # UR STRIP.AUTO: NEGATIVE /UL
SODIUM SERPL-SCNC: 139 MMOL/L (ref 136–145)
SP GR UR STRIP.AUTO: 1.04
TRIGL SERPL-MCNC: 147 MG/DL (ref 0–149)
TSH SERPL-ACNC: 2.56 MIU/L (ref 0.44–3.98)
URATE SERPL-MCNC: 3.2 MG/DL (ref 4–7.5)
UROBILINOGEN UR STRIP.AUTO-MCNC: NORMAL MG/DL
VIT B12 SERPL-MCNC: 281 PG/ML (ref 211–911)
VLDL: 29 MG/DL (ref 0–40)
WBC # BLD AUTO: 6.4 X10*3/UL (ref 4.4–11.3)

## 2024-04-17 PROCEDURE — 83036 HEMOGLOBIN GLYCOSYLATED A1C: CPT

## 2024-04-17 PROCEDURE — 81003 URINALYSIS AUTO W/O SCOPE: CPT

## 2024-04-17 PROCEDURE — 82570 ASSAY OF URINE CREATININE: CPT

## 2024-04-17 PROCEDURE — 80053 COMPREHEN METABOLIC PANEL: CPT

## 2024-04-17 PROCEDURE — 82607 VITAMIN B-12: CPT

## 2024-04-17 PROCEDURE — 85025 COMPLETE CBC W/AUTO DIFF WBC: CPT

## 2024-04-17 PROCEDURE — 84443 ASSAY THYROID STIM HORMONE: CPT

## 2024-04-17 PROCEDURE — 82306 VITAMIN D 25 HYDROXY: CPT

## 2024-04-17 PROCEDURE — G0103 PSA SCREENING: HCPCS

## 2024-04-17 PROCEDURE — 80061 LIPID PANEL: CPT

## 2024-04-17 PROCEDURE — 36415 COLL VENOUS BLD VENIPUNCTURE: CPT

## 2024-04-17 PROCEDURE — 82550 ASSAY OF CK (CPK): CPT

## 2024-04-17 PROCEDURE — 84550 ASSAY OF BLOOD/URIC ACID: CPT

## 2024-04-17 PROCEDURE — 82043 UR ALBUMIN QUANTITATIVE: CPT

## 2024-04-18 ENCOUNTER — APPOINTMENT (OUTPATIENT)
Dept: ENDOCRINOLOGY | Facility: CLINIC | Age: 69
End: 2024-04-18
Payer: MEDICARE

## 2024-04-22 ENCOUNTER — CLINICAL SUPPORT (OUTPATIENT)
Dept: ENDOCRINOLOGY | Facility: CLINIC | Age: 69
End: 2024-04-22
Payer: MEDICARE

## 2024-04-22 VITALS
HEART RATE: 77 BPM | DIASTOLIC BLOOD PRESSURE: 72 MMHG | BODY MASS INDEX: 29.86 KG/M2 | WEIGHT: 220.2 LBS | SYSTOLIC BLOOD PRESSURE: 104 MMHG

## 2024-04-22 DIAGNOSIS — I10 BENIGN ESSENTIAL HYPERTENSION: ICD-10-CM

## 2024-04-22 DIAGNOSIS — E11.65 TYPE 2 DIABETES MELLITUS WITH HYPERGLYCEMIA, UNSPECIFIED WHETHER LONG TERM INSULIN USE (MULTI): Primary | ICD-10-CM

## 2024-04-22 DIAGNOSIS — E78.00 HIGH CHOLESTEROL: ICD-10-CM

## 2024-04-22 PROCEDURE — 99213 OFFICE O/P EST LOW 20 MIN: CPT | Performed by: INTERNAL MEDICINE

## 2024-04-22 RX ORDER — SEMAGLUTIDE 2.68 MG/ML
2 INJECTION, SOLUTION SUBCUTANEOUS
Qty: 9 ML | Refills: 1 | Status: SHIPPED | OUTPATIENT
Start: 2024-04-22

## 2024-04-22 NOTE — PROGRESS NOTES
I, Dr Pepe Costa, have reviewed this progress note, medication list, vital signs, any pertinent lab values, and any CGM data if present with the [facility] and  face to face during this visit today. This note reflects the treatment plan that was made under my direction after reviewing the above mentioned elements while face to face with the patient and CDE.  I personally answered and addressed any questions and concerns the patient had during the visit today.  The CDE entered the data in this note under my direction and I personally reviewed it, signed any lab or medication orders that I instructed to be completed. I am the billing provider for this visit and the level of service was determined by my involvement in the Medical Decision Making Component of this visit while face to face with the patient.    Electronically signed by Pepe Costa MD on 4/22/24 at 11:32 AM.

## 2024-04-22 NOTE — PATIENT INSTRUCTIONS
Start vitamin b12 1mg once daily    Slowly increase Ozempic to 1mg weekly as tolerated   - try dialing 27 clicks once/week for next 4 weeks,  if tolerated then go up to full 1mg (36 clicks)    Keep up the great work!

## 2024-04-22 NOTE — PROGRESS NOTES
"HPI     67 yo with Diabetes 2 (dx in mid 50's) , HTN, Dyslipidemia, ED, Fatty liver dz, raven's esophagus presents for followup. Last A1C- 7.8%, this month 6.6%.    Pt is testing sugars <1 times per day. Pt is having low sugars 0 times/week. Pt's typical blood sugars are running mid 100's often in afternoon,  still not testing at other times of day. Pt is struggling following a carb controlled diet and knows reasonable carb allowances. Pt is able to afford their medication for the most part. Pt is not as active recently.    Taking metformin 500mg ER (2 is what he tolerates),  farxiga 10mg (cutting in 1/2 to help with cost),  adri 15mg,  ozempic 0.5mg weekly (lowered last visit, having gi effects on 1mg & 2mg dose).  Restarted Glimepiride 2mg qam last visit.     - cost is an issue as pt on medicare    Taking losartan 50mg and propranolol for htn.    Taking simvastatin 40mg qhs for lipids and tolerating.            Current Outpatient Medications:     alfuzosin (Uroxatral) 10 mg 24 hr tablet, Take 1 tablet (10 mg) by mouth once daily at bedtime., Disp: , Rfl:     Farxiga 10 mg, Take 1 tablet (10 mg) by mouth once daily., Disp: , Rfl:     glimepiride (Amaryl) 2 mg tablet, Take 1 tablet (2 mg) by mouth once daily in the morning. Take before meals., Disp: 90 tablet, Rfl: 1    lansoprazole (Prevacid) 15 mg DR capsule, Take 1 capsule (15 mg) by mouth once daily., Disp: , Rfl:     losartan (Cozaar) 50 mg tablet, Take 1 tablet (50 mg) by mouth once daily., Disp: , Rfl:     metFORMIN XR (Glucophage-XR) 500 mg 24 hr tablet, Take 2 tablets (1,000 mg) by mouth once daily in the evening. Take with meals., Disp: , Rfl:     pen needle, diabetic 32 gauge x 5/32\" needle, once daily. For 90, Disp: , Rfl:     pioglitazone (Actos) 15 mg tablet, Take 1 tablet (15 mg) by mouth once daily., Disp: 90 tablet, Rfl: 3    propranolol (Inderal) 40 mg tablet, Take 1 tablet (40 mg) by mouth twice a day., Disp: , Rfl:     simvastatin (Zocor) 40 mg " tablet, TAKE 1 TABLET BY MOUTH EVERY DAY IN THE EVENING, Disp: 90 tablet, Rfl: 3    semaglutide (Ozempic) 2 mg/dose (8 mg/3 mL) pen injector, Inject 2 mg under the skin 1 (one) time per week., Disp: 9 mL, Rfl: 1    Allergies as of 04/22/2024    (No Known Allergies)       /72   Pulse 77   Wt 99.9 kg (220 lb 3.2 oz)   BMI 29.86 kg/m²     Labs:   Lab Results   Component Value Date    WBC 6.4 04/17/2024    NRBC 0.0 04/17/2024    RBC 5.02 04/17/2024    HGB 15.8 04/17/2024    HCT 45.7 04/17/2024     04/17/2024     Lab Results   Component Value Date    CALCIUM 9.6 04/17/2024    AST 19 04/17/2024    ALKPHOS 99 04/17/2024    BILITOT 1.4 (H) 04/17/2024    PROT 7.1 04/17/2024    ALBUMIN 4.1 04/17/2024    GLOB 3.0 03/23/2023    AGR 1.3 (L) 03/23/2023     04/17/2024    K 4.8 04/17/2024     04/17/2024    CO2 30 04/17/2024    ANIONGAP 14 04/17/2024    BUN 16 04/17/2024    CREATININE 0.92 04/17/2024    UREACREAUR 12.5 03/23/2023    GLUCOSE 198 (H) 04/17/2024    ALT 33 04/17/2024    EGFR 90 04/17/2024     Lab Results   Component Value Date    CHOL 122 04/17/2024    TRIG 147 04/17/2024    HDL 38.2 04/17/2024    LDLCALC 54 04/17/2024     Lab Results   Component Value Date    MICROALBCREA  04/17/2024      Comment:      One or more analytes used in this calculation is outside of the analytical measurement range. Calculation cannot be performed.     Lab Results   Component Value Date    TSH 2.56 04/17/2024     Lab Results   Component Value Date    [id number] 281 04/17/2024     Lab Results   Component Value Date    HGBA1C 6.6 (H) 04/17/2024       Assessment/Plan   1. Type 2 diabetes mellitus with hyperglycemia, unspecified whether long term insulin use (Multi)    -A1c reviewed  -glycemic values reviewed  -labs reviewed, suggest starting vit b12 supplement   - does NOT qualify for uh pap per pt     -please test daily at different times of day    - can try slow incr ozempic back up to 1mg as tolerated   - use 2mg  pens to help with cost     - use 1/2 dose glimepiride or stop if start going low      2. High cholesterol  -on statin and tolerating  reviewed labs     3. HTN  -at target on arb, if lowers much more may need to lower losartan      Follow up: 4mon BB    -labs/tests/notes reviewed  -reviewed and counseled patient on medication monitoring and side effects    Treatment and plan discussed with Dr. Costa.  FRANCISCA Garcia, PharmD, BC-ADM, CDCES.

## 2024-05-13 ENCOUNTER — OFFICE VISIT (OUTPATIENT)
Dept: PRIMARY CARE | Facility: CLINIC | Age: 69
End: 2024-05-13
Payer: MEDICARE

## 2024-05-13 VITALS
SYSTOLIC BLOOD PRESSURE: 114 MMHG | DIASTOLIC BLOOD PRESSURE: 79 MMHG | WEIGHT: 221.8 LBS | HEART RATE: 78 BPM | BODY MASS INDEX: 30.08 KG/M2

## 2024-05-13 DIAGNOSIS — I10 BENIGN ESSENTIAL HYPERTENSION: Primary | ICD-10-CM

## 2024-05-13 DIAGNOSIS — E78.00 HIGH CHOLESTEROL: ICD-10-CM

## 2024-05-13 DIAGNOSIS — N40.0 BENIGN PROSTATIC HYPERPLASIA WITHOUT LOWER URINARY TRACT SYMPTOMS: ICD-10-CM

## 2024-05-13 DIAGNOSIS — E11.9 CONTROLLED TYPE 2 DIABETES MELLITUS WITHOUT COMPLICATION, WITHOUT LONG-TERM CURRENT USE OF INSULIN (MULTI): ICD-10-CM

## 2024-05-13 PROCEDURE — 3062F POS MACROALBUMINURIA REV: CPT | Performed by: INTERNAL MEDICINE

## 2024-05-13 PROCEDURE — 1160F RVW MEDS BY RX/DR IN RCRD: CPT | Performed by: INTERNAL MEDICINE

## 2024-05-13 PROCEDURE — 1036F TOBACCO NON-USER: CPT | Performed by: INTERNAL MEDICINE

## 2024-05-13 PROCEDURE — 99214 OFFICE O/P EST MOD 30 MIN: CPT | Performed by: INTERNAL MEDICINE

## 2024-05-13 PROCEDURE — 4010F ACE/ARB THERAPY RXD/TAKEN: CPT | Performed by: INTERNAL MEDICINE

## 2024-05-13 PROCEDURE — 3078F DIAST BP <80 MM HG: CPT | Performed by: INTERNAL MEDICINE

## 2024-05-13 PROCEDURE — 3074F SYST BP LT 130 MM HG: CPT | Performed by: INTERNAL MEDICINE

## 2024-05-13 PROCEDURE — 3048F LDL-C <100 MG/DL: CPT | Performed by: INTERNAL MEDICINE

## 2024-05-13 PROCEDURE — 1159F MED LIST DOCD IN RCRD: CPT | Performed by: INTERNAL MEDICINE

## 2024-05-13 PROCEDURE — 3044F HG A1C LEVEL LT 7.0%: CPT | Performed by: INTERNAL MEDICINE

## 2024-05-13 RX ORDER — TESTOSTERONE CYPIONATE 1000 MG/10ML
INJECTION, SOLUTION INTRAMUSCULAR
COMMUNITY

## 2024-05-13 ASSESSMENT — ENCOUNTER SYMPTOMS
FATIGUE: 0
NAUSEA: 0
DIZZINESS: 0
DIARRHEA: 0
ABDOMINAL DISTENTION: 0
HEADACHES: 0
ACTIVITY CHANGE: 0
CHILLS: 0
WHEEZING: 0
ABDOMINAL PAIN: 0
LIGHT-HEADEDNESS: 0
COUGH: 0
FREQUENCY: 1
PALPITATIONS: 0
SHORTNESS OF BREATH: 0
CHEST TIGHTNESS: 0
VOMITING: 0
DYSURIA: 0

## 2024-05-13 NOTE — PATIENT INSTRUCTIONS
I was glad to see that your blood work overall looked excellent.  Again, please follow-up with the urologist regarding the elevated PSA and please keep close follow-up with your specialist.  If you have any questions or concerns, or if you need any additional refills, please reach out anytime.  Otherwise, we are happy to see you back in November for your wellness visit.

## 2024-05-13 NOTE — PROGRESS NOTES
"Jakob Dyer \"José Manuel" comes in today for a comprehensive follow up.      Mr. Dyer comes in today for comprehensive follow-up.  He follows with his urologist and endocrinologist regularly.  He had comprehensive lab studies done last month all of which looked quite reasonable with the exception of a slightly elevated PSA.  He does have follow-up upcoming with urology in the next month.  He feels well.  He continues on his medications with no change.  He denies any symptoms of headaches, dizziness, chest pain or palpitations, shortness of breath nor cough, nausea, vomiting, nor changes in bowel habits.  He does not need refills, these are typically managed by his specialists.  He is here simply for his 6-month follow-up and really feels well overall.        Review of Systems   Constitutional:  Negative for activity change, chills and fatigue.   Respiratory:  Negative for cough, chest tightness, shortness of breath and wheezing.    Cardiovascular:  Negative for chest pain, palpitations and leg swelling.   Gastrointestinal:  Negative for abdominal distention, abdominal pain, diarrhea, nausea and vomiting.   Genitourinary:  Positive for frequency. Negative for dysuria.   Neurological:  Negative for dizziness, light-headedness and headaches.       Objective   Physical Exam  Constitutional:       General: He is not in acute distress.     Appearance: Normal appearance. He is not diaphoretic.   Cardiovascular:      Rate and Rhythm: Normal rate and regular rhythm.      Pulses: Normal pulses.      Heart sounds: Normal heart sounds. No murmur heard.     No gallop.   Pulmonary:      Effort: Pulmonary effort is normal. No respiratory distress.      Breath sounds: Normal breath sounds. No wheezing, rhonchi or rales.   Abdominal:      General: Bowel sounds are normal. There is no distension.      Palpations: Abdomen is soft.      Tenderness: There is no abdominal tenderness. There is no guarding or rebound.   Musculoskeletal:     "  Right lower leg: No edema.      Left lower leg: No edema.   Neurological:      Mental Status: He is alert.         Assessment/Plan   1.  Diabetes: Last labs excellent.  Continues on multiple medications, followed closely by his endocrinologist.  2.  Hypertension: Outstanding control on low-dose losartan, also on for renal protection.  Doing well, does not need refills.  BMP updated.  3.  Hyperlipidemia: Again, well-managed on current therapy.  4.  BPH with elevated PSA: Have again discussed his mildly elevated PSA.  He will discuss with his urologist at his upcoming visit.  We will defer repeat testing to his urology specialist.    Glad to see that he is doing so well.  He is to contact us with any questions.  Otherwise, we will plan on seeing him back in November for his wellness visit.  Problem List Items Addressed This Visit    None

## 2024-05-28 DIAGNOSIS — E11.9 CONTROLLED TYPE 2 DIABETES MELLITUS WITHOUT COMPLICATION, WITHOUT LONG-TERM CURRENT USE OF INSULIN (MULTI): Primary | ICD-10-CM

## 2024-05-28 RX ORDER — DAPAGLIFLOZIN 10 MG/1
10 TABLET, FILM COATED ORAL DAILY
Qty: 90 TABLET | Refills: 1 | Status: SHIPPED | OUTPATIENT
Start: 2024-05-28

## 2024-05-28 RX ORDER — METFORMIN HYDROCHLORIDE 500 MG/1
TABLET, EXTENDED RELEASE ORAL
Qty: 360 TABLET | Refills: 1 | Status: SHIPPED | OUTPATIENT
Start: 2024-05-28

## 2024-05-28 NOTE — TELEPHONE ENCOUNTER
Patient called in requesting Rx refills on Farxiga and Metformin be sent into [facility].   He is headed out of town tomorrow and requesting Rx's be sent in today.     Rx's pending.     Leta

## 2024-06-03 DIAGNOSIS — I10 BENIGN ESSENTIAL HYPERTENSION: Primary | ICD-10-CM

## 2024-06-03 RX ORDER — PROPRANOLOL HYDROCHLORIDE 40 MG/1
TABLET ORAL
Qty: 180 TABLET | Refills: 1 | Status: SHIPPED | OUTPATIENT
Start: 2024-06-03

## 2024-07-19 DIAGNOSIS — E11.9 DIABETES MELLITUS TYPE 2 WITHOUT RETINOPATHY (MULTI): ICD-10-CM

## 2024-07-19 DIAGNOSIS — E11.65 TYPE 2 DIABETES MELLITUS WITH HYPERGLYCEMIA, UNSPECIFIED WHETHER LONG TERM INSULIN USE (MULTI): Primary | ICD-10-CM

## 2024-07-19 RX ORDER — GLIMEPIRIDE 2 MG/1
2 TABLET ORAL
Qty: 90 TABLET | Refills: 1 | Status: SHIPPED | OUTPATIENT
Start: 2024-07-19 | End: 2025-07-19

## 2024-07-22 RX ORDER — LOSARTAN POTASSIUM 50 MG/1
50 TABLET ORAL DAILY
Qty: 90 TABLET | Refills: 2 | Status: SHIPPED | OUTPATIENT
Start: 2024-07-22

## 2024-08-26 ENCOUNTER — APPOINTMENT (OUTPATIENT)
Dept: ENDOCRINOLOGY | Facility: CLINIC | Age: 69
End: 2024-08-26
Payer: MEDICARE

## 2024-08-26 VITALS
DIASTOLIC BLOOD PRESSURE: 89 MMHG | SYSTOLIC BLOOD PRESSURE: 109 MMHG | BODY MASS INDEX: 29.8 KG/M2 | WEIGHT: 220 LBS | HEART RATE: 78 BPM | HEIGHT: 72 IN

## 2024-08-26 DIAGNOSIS — E11.65 TYPE 2 DIABETES MELLITUS WITH HYPERGLYCEMIA, UNSPECIFIED WHETHER LONG TERM INSULIN USE (MULTI): Primary | ICD-10-CM

## 2024-08-26 DIAGNOSIS — I10 BENIGN ESSENTIAL HYPERTENSION: ICD-10-CM

## 2024-08-26 DIAGNOSIS — E78.00 HIGH CHOLESTEROL: ICD-10-CM

## 2024-08-26 LAB — POC HEMOGLOBIN A1C: 6.5 % (ref 4.2–6.5)

## 2024-08-26 PROCEDURE — 99214 OFFICE O/P EST MOD 30 MIN: CPT | Performed by: INTERNAL MEDICINE

## 2024-08-26 PROCEDURE — 1126F AMNT PAIN NOTED NONE PRSNT: CPT | Performed by: INTERNAL MEDICINE

## 2024-08-26 PROCEDURE — 3044F HG A1C LEVEL LT 7.0%: CPT | Performed by: INTERNAL MEDICINE

## 2024-08-26 PROCEDURE — 3048F LDL-C <100 MG/DL: CPT | Performed by: INTERNAL MEDICINE

## 2024-08-26 PROCEDURE — 1036F TOBACCO NON-USER: CPT | Performed by: INTERNAL MEDICINE

## 2024-08-26 PROCEDURE — 3074F SYST BP LT 130 MM HG: CPT | Performed by: INTERNAL MEDICINE

## 2024-08-26 PROCEDURE — 3008F BODY MASS INDEX DOCD: CPT | Performed by: INTERNAL MEDICINE

## 2024-08-26 PROCEDURE — 3062F POS MACROALBUMINURIA REV: CPT | Performed by: INTERNAL MEDICINE

## 2024-08-26 PROCEDURE — 4010F ACE/ARB THERAPY RXD/TAKEN: CPT | Performed by: INTERNAL MEDICINE

## 2024-08-26 PROCEDURE — 1159F MED LIST DOCD IN RCRD: CPT | Performed by: INTERNAL MEDICINE

## 2024-08-26 PROCEDURE — 3079F DIAST BP 80-89 MM HG: CPT | Performed by: INTERNAL MEDICINE

## 2024-08-26 PROCEDURE — 83036 HEMOGLOBIN GLYCOSYLATED A1C: CPT | Performed by: INTERNAL MEDICINE

## 2024-08-26 ASSESSMENT — LIFESTYLE VARIABLES
HOW OFTEN DO YOU HAVE A DRINK CONTAINING ALCOHOL: 2-4 TIMES A MONTH
HOW MANY STANDARD DRINKS CONTAINING ALCOHOL DO YOU HAVE ON A TYPICAL DAY: 1 OR 2
HOW OFTEN DO YOU HAVE SIX OR MORE DRINKS ON ONE OCCASION: NEVER
SKIP TO QUESTIONS 9-10: 1
AUDIT-C TOTAL SCORE: 2

## 2024-08-26 ASSESSMENT — ENCOUNTER SYMPTOMS
LOSS OF SENSATION IN FEET: 0
DEPRESSION: 0
OCCASIONAL FEELINGS OF UNSTEADINESS: 0

## 2024-08-26 ASSESSMENT — PATIENT HEALTH QUESTIONNAIRE - PHQ9
SUM OF ALL RESPONSES TO PHQ9 QUESTIONS 1 & 2: 0
1. LITTLE INTEREST OR PLEASURE IN DOING THINGS: NOT AT ALL
2. FEELING DOWN, DEPRESSED OR HOPELESS: NOT AT ALL

## 2024-08-26 ASSESSMENT — PAIN SCALES - GENERAL: PAINLEVEL: 0-NO PAIN

## 2024-08-26 NOTE — PROGRESS NOTES
"HPI   67 yo with Diabetes 2 (dx in mid 50's) , HTN, Dyslipidemia, ED, Fatty liver dz, raven's esophagus presents for followup. Last A1C- 6.6%, today 6.5%.     Pt is testing sugars <1 times per day. Pt is having low sugars 0 times/week. Pt's typical blood sugars are running mid 100's often in afternoon,  at different times of day.   Pt is struggling following a carb controlled diet and knows reasonable carb allowances. Pt is able to afford their medication for the most part. Pt is not as active recently.     Taking metformin 500mg ER (2 is what he tolerates),  farxiga 10mg (cutting in 1/2 to help with cost),  adri 15mg,  ozempic 2 mg weekly ( 27 clicks on this and tolerating), glimepiride 2mg qam last visit.   - cost is an issue as pt on medicare, doesn't qualify for  PAP in 2024     Taking losartan 50mg and propranolol for htn.     Taking simvastatin 40mg qhs for lipids and tolerating.       Current Outpatient Medications:     alfuzosin (Uroxatral) 10 mg 24 hr tablet, Take 1 tablet (10 mg) by mouth once daily at bedtime., Disp: , Rfl:     Farxiga 10 mg, Take 1 tablet (10 mg) by mouth once daily., Disp: 90 tablet, Rfl: 1    glimepiride (Amaryl) 2 mg tablet, TAKE 1 TABLET (2 MG) BY MOUTH ONCE DAILY IN THE MORNING. TAKE BEFORE MEALS., Disp: 90 tablet, Rfl: 1    lansoprazole (Prevacid) 15 mg DR capsule, Take 1 capsule (15 mg) by mouth once daily., Disp: , Rfl:     losartan (Cozaar) 50 mg tablet, TAKE 1 TABLET BY MOUTH EVERY DAY, Disp: 90 tablet, Rfl: 2    metFORMIN  mg 24 hr tablet, 4 pills daily, Disp: 360 tablet, Rfl: 1    pen needle, diabetic 32 gauge x 5/32\" needle, once daily. For 90, Disp: , Rfl:     pioglitazone (Actos) 15 mg tablet, Take 1 tablet (15 mg) by mouth once daily., Disp: 90 tablet, Rfl: 3    propranolol (Inderal) 40 mg tablet, AS DIRECTED ORALLY TWICE A DAY, Disp: 180 tablet, Rfl: 1    semaglutide (Ozempic) 2 mg/dose (8 mg/3 mL) pen injector, Inject 2 mg under the skin 1 (one) time per week., " Disp: 9 mL, Rfl: 1    simvastatin (Zocor) 40 mg tablet, TAKE 1 TABLET BY MOUTH EVERY DAY IN THE EVENING, Disp: 90 tablet, Rfl: 3    testosterone cypionate (Depo-Testosterone) 100 mg/mL injection, Inject into the muscle., Disp: , Rfl:       Allergies as of 08/26/2024    (No Known Allergies)         Review of Systems   Cardiology: Lightheadedness-denies.  Chest pain-denies.  Leg edema-denies.  Palpitations-denies.  Respiratory: Cough-denies. Shortness of breath-denies.  Wheezing-denies.  Gastroenterology: Constipation-denies.  Diarrhea-denies.  Heartburn-denies.  Endocrinology: Cold intolerance-denies.  Heat intolerance-denies.  Sweats-denies.  Neurology: Headache-denies.  Tremor-denies.  Neuropathy in extremities-denies.  Psychology: Low energy-denies.  Irritability-denies.  Sleep disturbances-denies.      /89   Pulse 78   Ht 1.829 m (6')   Wt 99.8 kg (220 lb)   BMI 29.84 kg/m²       Labs:  Lab Results   Component Value Date    WBC 6.4 04/17/2024    NRBC 0.0 04/17/2024    RBC 5.02 04/17/2024    HGB 15.8 04/17/2024    HCT 45.7 04/17/2024     04/17/2024     Lab Results   Component Value Date    CALCIUM 9.6 04/17/2024    AST 19 04/17/2024    ALKPHOS 99 04/17/2024    BILITOT 1.4 (H) 04/17/2024    PROT 7.1 04/17/2024    ALBUMIN 4.1 04/17/2024    GLOB 3.0 03/23/2023    AGR 1.3 (L) 03/23/2023     04/17/2024    K 4.8 04/17/2024     04/17/2024    CO2 30 04/17/2024    ANIONGAP 14 04/17/2024    BUN 16 04/17/2024    CREATININE 0.92 04/17/2024    UREACREAUR 12.5 03/23/2023    GLUCOSE 198 (H) 04/17/2024    ALT 33 04/17/2024    EGFR 90 04/17/2024     Lab Results   Component Value Date    CHOL 122 04/17/2024    TRIG 147 04/17/2024    HDL 38.2 04/17/2024    LDLCALC 54 04/17/2024     Lab Results   Component Value Date    MICROALBCREA  04/17/2024      Comment:      One or more analytes used in this calculation is outside of the analytical measurement range. Calculation cannot be performed.     Lab Results    Component Value Date    TSH 2.56 04/17/2024     Lab Results   Component Value Date    [id number] 281 04/17/2024     Lab Results   Component Value Date    HGBA1C 6.5 08/26/2024         Physical Exam   General Appearance: pleasant, cooperative, no acute distress  HEENT: no chemosis, no proptosis, no lid lag, no lid retraction  Neck: no lymphadenopathy, no thyromegaly, no dominant thyroid nodules  Heart: no murmurs, regular rate and rhythm, S1 and S2  Lungs: no wheezes, no rhonci, no rales  Extremities: no lower extremity swelling      Assessment/Plan   1. Type 2 diabetes mellitus with hyperglycemia, unspecified whether long term insulin use (Multi)  -A1c ordered and reviewed  -labs reviewed  -glycemic log reviewed    -try to titrate 2mg ozempic pen from 27 clicks to 36 clicks as tolerated  -low threshold to lower or stop glimepiride if having lows    2. High cholesterol  -on statin and tolerating, labs reviewed, no change    3. Benign essential hypertension  -at target on therapy, no change         Follow Up:  david Briceno 6 months    -labs/tests/notes reviewed  -reviewed and counseled patient on medication monitoring and side effects

## 2024-09-06 DIAGNOSIS — I10 BENIGN ESSENTIAL HYPERTENSION: ICD-10-CM

## 2024-09-06 RX ORDER — PROPRANOLOL HYDROCHLORIDE 40 MG/1
TABLET ORAL
Qty: 180 TABLET | Refills: 1 | Status: SHIPPED | OUTPATIENT
Start: 2024-09-06

## 2024-09-22 DIAGNOSIS — E11.9 DIABETES MELLITUS TYPE 2 WITHOUT RETINOPATHY (MULTI): ICD-10-CM

## 2024-09-23 RX ORDER — PIOGLITAZONEHYDROCHLORIDE 15 MG/1
15 TABLET ORAL DAILY
Qty: 90 TABLET | Refills: 3 | Status: SHIPPED | OUTPATIENT
Start: 2024-09-23

## 2024-10-24 ENCOUNTER — APPOINTMENT (OUTPATIENT)
Dept: UROLOGY | Facility: CLINIC | Age: 69
End: 2024-10-24
Payer: MEDICARE

## 2024-10-24 VITALS — BODY MASS INDEX: 29.8 KG/M2 | TEMPERATURE: 95.6 F | HEIGHT: 72 IN | WEIGHT: 220 LBS

## 2024-10-24 DIAGNOSIS — N40.1 BENIGN PROSTATIC HYPERPLASIA WITH LOWER URINARY TRACT SYMPTOMS, SYMPTOM DETAILS UNSPECIFIED: Primary | ICD-10-CM

## 2024-10-24 DIAGNOSIS — R39.9 URINARY SYMPTOM OR SIGN: ICD-10-CM

## 2024-10-24 DIAGNOSIS — R97.20 ELEVATED PSA: ICD-10-CM

## 2024-10-24 PROCEDURE — 1160F RVW MEDS BY RX/DR IN RCRD: CPT | Performed by: UROLOGY

## 2024-10-24 PROCEDURE — G2211 COMPLEX E/M VISIT ADD ON: HCPCS | Performed by: UROLOGY

## 2024-10-24 PROCEDURE — 99214 OFFICE O/P EST MOD 30 MIN: CPT | Performed by: UROLOGY

## 2024-10-24 PROCEDURE — 4010F ACE/ARB THERAPY RXD/TAKEN: CPT | Performed by: UROLOGY

## 2024-10-24 PROCEDURE — 1036F TOBACCO NON-USER: CPT | Performed by: UROLOGY

## 2024-10-24 PROCEDURE — 3062F POS MACROALBUMINURIA REV: CPT | Performed by: UROLOGY

## 2024-10-24 PROCEDURE — 3008F BODY MASS INDEX DOCD: CPT | Performed by: UROLOGY

## 2024-10-24 PROCEDURE — 3048F LDL-C <100 MG/DL: CPT | Performed by: UROLOGY

## 2024-10-24 PROCEDURE — 3044F HG A1C LEVEL LT 7.0%: CPT | Performed by: UROLOGY

## 2024-10-24 PROCEDURE — 1159F MED LIST DOCD IN RCRD: CPT | Performed by: UROLOGY

## 2024-10-24 PROCEDURE — 1126F AMNT PAIN NOTED NONE PRSNT: CPT | Performed by: UROLOGY

## 2024-10-24 RX ORDER — ALFUZOSIN HYDROCHLORIDE 10 MG/1
10 TABLET, EXTENDED RELEASE ORAL NIGHTLY
Qty: 90 TABLET | Refills: 3 | Status: SHIPPED | OUTPATIENT
Start: 2024-10-24

## 2024-10-24 ASSESSMENT — PAIN SCALES - GENERAL: PAINLEVEL_OUTOF10: 0-NO PAIN

## 2024-10-24 NOTE — PROGRESS NOTES
"  Patient is a 68 y.o. male presenting for prostate check    SUBJECTIVE:  HPI   PSA was 5.33 on 2024.  He was 1.9 in 2021. He has BPH and continues alfuzosin.        No results found for: \"URINECULTURE\"     Past Medical History:   Diagnosis Date    Choledocholithiasis 2017    Cholelithiasis 2017    Essential hypertension     Pyloric stenosis, congenital (Multi)     Type 2 diabetes mellitus with hyperglycemia, unspecified whether long term insulin use (Multi)       Past Surgical History:   Procedure Laterality Date    CHOLECYSTECTOMY      KNEE ARTHROSCOPY W/ MENISCAL REPAIR      bilateral    PYLOROPLASTY      TONSILLECTOMY        Family History   Problem Relation Name Age of Onset    Heart disease Mother      Asthma Mother      Valvular heart disease Mother      Diabetes Father      Diabetes Maternal Grandfather        Social History     Socioeconomic History    Marital status:    Tobacco Use    Smoking status: Former     Current packs/day: 0.00     Types: Cigarettes     Quit date:      Years since quittin.8    Smokeless tobacco: Never   Substance and Sexual Activity    Alcohol use: Yes     Alcohol/week: 2.0 standard drinks of alcohol     Types: 2 Cans of beer per week     Comment: rarely    Drug use: Defer    Sexual activity: Defer        Review of Systems   Constitutional: denies any unintentional weight loss or change in strength.  Integumentary: denies any rashes or pruritus.  Eyes: denies any double vision or eye pain.  Ear/Nose/Mouth/Throat: denies any nosebleeds or gum bleeds.  Cardiovascular: denies any chest pain or syncope.  Respiratory: denies hemoptysis.  Gastrointestinal: denies nausea or vomiting.  Musculoskeletal: denies muscle cramping or weakness.  Neurologic: denies convulsions or seizures.  Hematologic/Lymphatic: denies bleeding tendencies.  Endocrine: denies heat/cold intolerance.  All other systems have been reviewed and are negative unless otherwise noted " "in the HPI.    OBJECTIVE:  Visit Vitals  Temp 35.3 °C (95.6 °F)     Physical Exam   Constitutional: No obvious distress.  Eyes: Non-injected conjunctiva, sclera clear, EOMI.  Ears/Nose/Mouth/Throat: No obvious drainage per ears or nose.  Cardiovascular: Extremities are warm and well perfused. No edema, cyanosis or pallor.  Respiratory: No audible wheezing/stridor; respirations do not appear labored.  Gastrointestinal: Abdomen soft, not distended.  Musculoskeletal: Normal ROM of extremities.  Skin: No obvious rashes or open sores.  Neurologic: Alert and oriented, CN 2-12 grossly intact.  Psychiatric: Answers questions appropriately with normal affect.  Hematologic/Lymphatic/Immunologic: No obvious bruises or sites of spontaneous bleeding.  Genitourinary: No CVA tenderness, bladder not palpable. 3+ prostate    Labs:  Lab Results   Component Value Date    WBC 6.4 04/17/2024    HGB 15.8 04/17/2024    HCT 45.7 04/17/2024     04/17/2024    CHOL 122 04/17/2024    TRIG 147 04/17/2024    HDL 38.2 04/17/2024    ALT 33 04/17/2024    AST 19 04/17/2024     04/17/2024    K 4.8 04/17/2024     04/17/2024    CREATININE 0.92 04/17/2024    BUN 16 04/17/2024    CO2 30 04/17/2024    TSH 2.56 04/17/2024    PSA 1.9 08/11/2021    HGBA1C 6.5 08/26/2024    ALBUR 12 03/23/2023     No results found for: \"KPSAT\", \"KPSAP\"  IMAGING:        PROCEDURES:    ASSESSMENT/PLAN:  Problem List Items Addressed This Visit       BPH (benign prostatic hyperplasia) - Primary    Relevant Medications    alfuzosin (Uroxatral) 10 mg 24 hr tablet    Other Relevant Orders    Measure post void residual (Completed)    Elevated PSA    Relevant Orders    Prostate Specific Antigen    MR prostate with jorge boundaries if pirads 3 or above     Other Visit Diagnoses       Urinary symptom or sign               He has an elevated PSA at 5.3.  He will repeat a PSA today.  If his level remains elevated, a prostate MRI followed by a biopsy is planned.      He " has a history of BPH.  He is treated with alfuzosin.    All questions were answered to the patient’s satisfaction.  Patient agrees with the plan and wishes to proceed.  Follow-up will be scheduled appropriately.     Nigel Hernandez MD

## 2024-11-01 ENCOUNTER — LAB (OUTPATIENT)
Dept: LAB | Facility: LAB | Age: 69
End: 2024-11-01
Payer: MEDICARE

## 2024-11-01 DIAGNOSIS — R97.20 ELEVATED PSA: ICD-10-CM

## 2024-11-01 LAB — PSA SERPL-MCNC: 4.94 NG/ML

## 2024-11-01 PROCEDURE — 84153 ASSAY OF PSA TOTAL: CPT

## 2024-11-01 PROCEDURE — 36415 COLL VENOUS BLD VENIPUNCTURE: CPT

## 2024-11-04 ENCOUNTER — APPOINTMENT (OUTPATIENT)
Dept: RADIOLOGY | Facility: HOSPITAL | Age: 69
End: 2024-11-04
Payer: MEDICARE

## 2024-11-12 ENCOUNTER — HOSPITAL ENCOUNTER (OUTPATIENT)
Dept: RADIOLOGY | Facility: HOSPITAL | Age: 69
Discharge: HOME | End: 2024-11-12
Payer: MEDICARE

## 2024-11-12 DIAGNOSIS — R97.20 ELEVATED PSA: ICD-10-CM

## 2024-11-12 PROCEDURE — 76498 UNLISTED MR PROCEDURE: CPT

## 2024-11-12 PROCEDURE — A9575 INJ GADOTERATE MEGLUMI 0.1ML: HCPCS | Performed by: UROLOGY

## 2024-11-12 PROCEDURE — 72197 MRI PELVIS W/O & W/DYE: CPT

## 2024-11-12 PROCEDURE — 72197 MRI PELVIS W/O & W/DYE: CPT | Performed by: RADIOLOGY

## 2024-11-12 PROCEDURE — 2550000001 HC RX 255 CONTRASTS: Performed by: UROLOGY

## 2024-11-12 RX ORDER — GADOTERATE MEGLUMINE 376.9 MG/ML
19 INJECTION INTRAVENOUS
Status: COMPLETED | OUTPATIENT
Start: 2024-11-12 | End: 2024-11-12

## 2024-11-13 ENCOUNTER — APPOINTMENT (OUTPATIENT)
Dept: PRIMARY CARE | Facility: CLINIC | Age: 69
End: 2024-11-13
Payer: MEDICARE

## 2024-11-13 ENCOUNTER — LAB (OUTPATIENT)
Dept: LAB | Facility: LAB | Age: 69
End: 2024-11-13
Payer: MEDICARE

## 2024-11-13 VITALS
HEART RATE: 87 BPM | HEIGHT: 72 IN | WEIGHT: 218 LBS | BODY MASS INDEX: 29.53 KG/M2 | SYSTOLIC BLOOD PRESSURE: 116 MMHG | DIASTOLIC BLOOD PRESSURE: 77 MMHG

## 2024-11-13 DIAGNOSIS — E78.00 HIGH CHOLESTEROL: ICD-10-CM

## 2024-11-13 DIAGNOSIS — E11.9 CONTROLLED TYPE 2 DIABETES MELLITUS WITHOUT COMPLICATION, WITHOUT LONG-TERM CURRENT USE OF INSULIN (MULTI): ICD-10-CM

## 2024-11-13 DIAGNOSIS — I10 BENIGN ESSENTIAL HYPERTENSION: ICD-10-CM

## 2024-11-13 DIAGNOSIS — Z00.00 ROUTINE GENERAL MEDICAL EXAMINATION AT HEALTH CARE FACILITY: Primary | ICD-10-CM

## 2024-11-13 DIAGNOSIS — E55.9 VITAMIN D DEFICIENCY: ICD-10-CM

## 2024-11-13 DIAGNOSIS — K22.70 BARRETT'S ESOPHAGUS WITHOUT DYSPLASIA: ICD-10-CM

## 2024-11-13 DIAGNOSIS — N40.0 BENIGN PROSTATIC HYPERPLASIA WITHOUT LOWER URINARY TRACT SYMPTOMS: ICD-10-CM

## 2024-11-13 LAB
25(OH)D3 SERPL-MCNC: 21 NG/ML (ref 30–100)
ALBUMIN SERPL BCP-MCNC: 4.3 G/DL (ref 3.4–5)
ALP SERPL-CCNC: 89 U/L (ref 33–136)
ALT SERPL W P-5'-P-CCNC: 48 U/L (ref 10–52)
ANION GAP SERPL CALC-SCNC: 12 MMOL/L (ref 10–20)
AST SERPL W P-5'-P-CCNC: 24 U/L (ref 9–39)
BASOPHILS # BLD AUTO: 0.04 X10*3/UL (ref 0–0.1)
BASOPHILS NFR BLD AUTO: 0.5 %
BILIRUB SERPL-MCNC: 1.6 MG/DL (ref 0–1.2)
BUN SERPL-MCNC: 15 MG/DL (ref 6–23)
CALCIUM SERPL-MCNC: 9.6 MG/DL (ref 8.6–10.6)
CHLORIDE SERPL-SCNC: 105 MMOL/L (ref 98–107)
CHOLEST SERPL-MCNC: 126 MG/DL (ref 0–199)
CHOLESTEROL/HDL RATIO: 3.1
CK SERPL-CCNC: 37 U/L (ref 0–325)
CO2 SERPL-SCNC: 27 MMOL/L (ref 21–32)
CREAT SERPL-MCNC: 0.88 MG/DL (ref 0.5–1.3)
CREAT UR-MCNC: 118 MG/DL (ref 20–370)
EGFRCR SERPLBLD CKD-EPI 2021: >90 ML/MIN/1.73M*2
EOSINOPHIL # BLD AUTO: 0.25 X10*3/UL (ref 0–0.7)
EOSINOPHIL NFR BLD AUTO: 3 %
ERYTHROCYTE [DISTWIDTH] IN BLOOD BY AUTOMATED COUNT: 11.6 % (ref 11.5–14.5)
EST. AVERAGE GLUCOSE BLD GHB EST-MCNC: 126 MG/DL
GLUCOSE SERPL-MCNC: 180 MG/DL (ref 74–99)
HBA1C MFR BLD: 6 %
HCT VFR BLD AUTO: 46.2 % (ref 41–52)
HDLC SERPL-MCNC: 40.4 MG/DL
HGB BLD-MCNC: 16.4 G/DL (ref 13.5–17.5)
IMM GRANULOCYTES # BLD AUTO: 0.02 X10*3/UL (ref 0–0.7)
IMM GRANULOCYTES NFR BLD AUTO: 0.2 % (ref 0–0.9)
LDLC SERPL CALC-MCNC: 46 MG/DL
LYMPHOCYTES # BLD AUTO: 1.83 X10*3/UL (ref 1.2–4.8)
LYMPHOCYTES NFR BLD AUTO: 21.9 %
MCH RBC QN AUTO: 32.6 PG (ref 26–34)
MCHC RBC AUTO-ENTMCNC: 35.5 G/DL (ref 32–36)
MCV RBC AUTO: 92 FL (ref 80–100)
MICROALBUMIN UR-MCNC: <7 MG/L
MICROALBUMIN/CREAT UR: NORMAL MG/G{CREAT}
MONOCYTES # BLD AUTO: 0.61 X10*3/UL (ref 0.1–1)
MONOCYTES NFR BLD AUTO: 7.3 %
NEUTROPHILS # BLD AUTO: 5.61 X10*3/UL (ref 1.2–7.7)
NEUTROPHILS NFR BLD AUTO: 67.1 %
NON HDL CHOLESTEROL: 86 MG/DL (ref 0–149)
NRBC BLD-RTO: 0 /100 WBCS (ref 0–0)
PLATELET # BLD AUTO: 300 X10*3/UL (ref 150–450)
POTASSIUM SERPL-SCNC: 4.1 MMOL/L (ref 3.5–5.3)
PROT SERPL-MCNC: 7 G/DL (ref 6.4–8.2)
RBC # BLD AUTO: 5.03 X10*6/UL (ref 4.5–5.9)
SODIUM SERPL-SCNC: 140 MMOL/L (ref 136–145)
TRIGL SERPL-MCNC: 198 MG/DL (ref 0–149)
TSH SERPL-ACNC: 2.86 MIU/L (ref 0.44–3.98)
URATE SERPL-MCNC: 3.5 MG/DL (ref 4–7.5)
VIT B12 SERPL-MCNC: >2000 PG/ML (ref 211–911)
VLDL: 40 MG/DL (ref 0–40)
WBC # BLD AUTO: 8.4 X10*3/UL (ref 4.4–11.3)

## 2024-11-13 PROCEDURE — 1170F FXNL STATUS ASSESSED: CPT | Performed by: INTERNAL MEDICINE

## 2024-11-13 PROCEDURE — 4010F ACE/ARB THERAPY RXD/TAKEN: CPT | Performed by: INTERNAL MEDICINE

## 2024-11-13 PROCEDURE — 82607 VITAMIN B-12: CPT

## 2024-11-13 PROCEDURE — 80053 COMPREHEN METABOLIC PANEL: CPT

## 2024-11-13 PROCEDURE — 82043 UR ALBUMIN QUANTITATIVE: CPT

## 2024-11-13 PROCEDURE — 3048F LDL-C <100 MG/DL: CPT | Performed by: INTERNAL MEDICINE

## 2024-11-13 PROCEDURE — G0439 PPPS, SUBSEQ VISIT: HCPCS | Performed by: INTERNAL MEDICINE

## 2024-11-13 PROCEDURE — 1160F RVW MEDS BY RX/DR IN RCRD: CPT | Performed by: INTERNAL MEDICINE

## 2024-11-13 PROCEDURE — 84443 ASSAY THYROID STIM HORMONE: CPT

## 2024-11-13 PROCEDURE — 3078F DIAST BP <80 MM HG: CPT | Performed by: INTERNAL MEDICINE

## 2024-11-13 PROCEDURE — 1159F MED LIST DOCD IN RCRD: CPT | Performed by: INTERNAL MEDICINE

## 2024-11-13 PROCEDURE — 83036 HEMOGLOBIN GLYCOSYLATED A1C: CPT

## 2024-11-13 PROCEDURE — 93000 ELECTROCARDIOGRAM COMPLETE: CPT | Performed by: INTERNAL MEDICINE

## 2024-11-13 PROCEDURE — 3044F HG A1C LEVEL LT 7.0%: CPT | Performed by: INTERNAL MEDICINE

## 2024-11-13 PROCEDURE — 80061 LIPID PANEL: CPT

## 2024-11-13 PROCEDURE — 99214 OFFICE O/P EST MOD 30 MIN: CPT | Performed by: INTERNAL MEDICINE

## 2024-11-13 PROCEDURE — 82570 ASSAY OF URINE CREATININE: CPT

## 2024-11-13 PROCEDURE — 3008F BODY MASS INDEX DOCD: CPT | Performed by: INTERNAL MEDICINE

## 2024-11-13 PROCEDURE — 1036F TOBACCO NON-USER: CPT | Performed by: INTERNAL MEDICINE

## 2024-11-13 PROCEDURE — 36415 COLL VENOUS BLD VENIPUNCTURE: CPT

## 2024-11-13 PROCEDURE — 1123F ACP DISCUSS/DSCN MKR DOCD: CPT | Performed by: INTERNAL MEDICINE

## 2024-11-13 PROCEDURE — 85025 COMPLETE CBC W/AUTO DIFF WBC: CPT

## 2024-11-13 PROCEDURE — 84550 ASSAY OF BLOOD/URIC ACID: CPT

## 2024-11-13 PROCEDURE — 3062F POS MACROALBUMINURIA REV: CPT | Performed by: INTERNAL MEDICINE

## 2024-11-13 PROCEDURE — 82306 VITAMIN D 25 HYDROXY: CPT

## 2024-11-13 PROCEDURE — 3074F SYST BP LT 130 MM HG: CPT | Performed by: INTERNAL MEDICINE

## 2024-11-13 PROCEDURE — 82550 ASSAY OF CK (CPK): CPT

## 2024-11-13 ASSESSMENT — ENCOUNTER SYMPTOMS
NAUSEA: 0
EYE DISCHARGE: 0
COUGH: 0
SLEEP DISTURBANCE: 0
COLOR CHANGE: 0
HEMATURIA: 0
WHEEZING: 0
CHILLS: 0
LOSS OF SENSATION IN FEET: 0
FEVER: 0
ACTIVITY CHANGE: 0
MYALGIAS: 0
FATIGUE: 0
PALPITATIONS: 0
HYPERACTIVE: 0
WEAKNESS: 0
SHORTNESS OF BREATH: 0
RHINORRHEA: 0
ABDOMINAL DISTENTION: 0
DIZZINESS: 0
DIARRHEA: 0
OCCASIONAL FEELINGS OF UNSTEADINESS: 0
DEPRESSION: 0
ARTHRALGIAS: 0
ABDOMINAL PAIN: 0
SINUS PRESSURE: 0
DYSURIA: 0
LIGHT-HEADEDNESS: 0
DIFFICULTY URINATING: 0
VOMITING: 0
BRUISES/BLEEDS EASILY: 0
CHEST TIGHTNESS: 0
DECREASED CONCENTRATION: 0
NERVOUS/ANXIOUS: 0
SORE THROAT: 0
ADENOPATHY: 0
SEIZURES: 0
DYSPHORIC MOOD: 0
VOICE CHANGE: 0
NECK STIFFNESS: 0
CONSTIPATION: 0
BACK PAIN: 0
SINUS PAIN: 0
FREQUENCY: 1
HEADACHES: 0

## 2024-11-13 ASSESSMENT — ACTIVITIES OF DAILY LIVING (ADL)
DOING_HOUSEWORK: INDEPENDENT
DRESSING: INDEPENDENT
TAKING_MEDICATION: INDEPENDENT
GROCERY_SHOPPING: INDEPENDENT
BATHING: INDEPENDENT
MANAGING_FINANCES: INDEPENDENT

## 2024-11-13 ASSESSMENT — PATIENT HEALTH QUESTIONNAIRE - PHQ9
1. LITTLE INTEREST OR PLEASURE IN DOING THINGS: NOT AT ALL
2. FEELING DOWN, DEPRESSED OR HOPELESS: NOT AT ALL
SUM OF ALL RESPONSES TO PHQ9 QUESTIONS 1 AND 2: 0

## 2024-11-13 NOTE — PATIENT INSTRUCTIONS
It was a pleasure seeing you in the office today.  We will follow up on all comprehensive blood work once results are available and make any recommendations based on these results as may be indicated.  Colon cancer screening is up-to-date from 2022, recommended to be repeated in 5 years total.  We will proceed with further cardiac screening with a calcium scoring scan, please schedule at your convenience.  Thank you for keeping up with routine vaccines.  Please keep close follow-up with your specialists.  If you have any questions, please feel free to contact us.  Otherwise, we should check in with a brief visit in 6 months.

## 2024-11-13 NOTE — PROGRESS NOTES
Subjective   Reason for Visit: Jakob Dyer is an 68 y.o. male patient comes in today for comprehensive follow-up of medical conditions in conjunction with annual wellness visit    Mr. Dyer comes in today for comprehensive follow-up of medical conditions in conjunction with annual wellness visit, dictated in a separate note.  Please refer to that note for details on healthcare maintenance and screening studies.    Mr. Dyer is in today for comprehensive follow-up.  He has been doing well.  He follows with his urologist regularly and recently had his PSA checked which was slightly elevated, had a subsequent MRI done which reportedly was reassuring.  He follows with his endocrinologist closely, on a cocktail of medications for his diabetes, but typically well-managed.  He was having troubles tolerating higher doses of Ozempic so now is actually taking about a third dose weekly and is tolerating this well with sugars continuing to be managed.  He is amenable to updating comprehensive labs.  Admits that he needs to exercise a bit more, plans on getting back to his community center during the winter months.  Situational stressors have been moderate, but he is managing.  He denies any specific headaches, dizziness, chest pain or palpitations.  With stress he will have some chest heaviness, this has been a bit more with recent stressors, but never exertional.  He denies any changes in his bowel habits, appetite is good.  He denies any injuries nor significant joint issues.  He continues on his medications with no troubles, again amenable to updating comprehensive lab studies today.  He feels well overall.        Patient Care Team:  Akiko Killian MD as PCP - General (Internal Medicine)  Akiko Killian MD as PCP - [facility] Attributed Provider     Review of Systems   Constitutional:  Negative for activity change, chills, fatigue and fever.   HENT:  Positive for hearing loss. Negative for congestion, ear pain, postnasal drip,  rhinorrhea, sinus pressure, sinus pain, sore throat, tinnitus and voice change.    Eyes:  Negative for discharge and visual disturbance.   Respiratory:  Negative for cough, chest tightness, shortness of breath and wheezing.    Cardiovascular:  Negative for chest pain, palpitations and leg swelling.   Gastrointestinal:  Negative for abdominal distention, abdominal pain, constipation, diarrhea, nausea and vomiting.   Endocrine: Negative for cold intolerance, heat intolerance and polyuria.   Genitourinary:  Positive for frequency. Negative for difficulty urinating, dysuria, hematuria and testicular pain.   Musculoskeletal:  Negative for arthralgias, back pain, myalgias and neck stiffness.   Skin:  Negative for color change, pallor and rash.   Allergic/Immunologic: Negative for environmental allergies, food allergies and immunocompromised state.   Neurological:  Negative for dizziness, seizures, syncope, weakness, light-headedness and headaches.   Hematological:  Negative for adenopathy. Does not bruise/bleed easily.   Psychiatric/Behavioral:  Negative for behavioral problems, decreased concentration, dysphoric mood and sleep disturbance. The patient is not nervous/anxious and is not hyperactive.        Objective   Vitals:  /77   Pulse 87   Ht 1.829 m (6')   Wt 98.9 kg (218 lb)   BMI 29.57 kg/m²       Physical Exam  Vitals reviewed.   Constitutional:       General: He is not in acute distress.     Appearance: Normal appearance. He is not ill-appearing or diaphoretic.   HENT:      Head: Normocephalic and atraumatic.      Right Ear: Tympanic membrane, ear canal and external ear normal. There is no impacted cerumen.      Left Ear: Tympanic membrane, ear canal and external ear normal. There is no impacted cerumen.      Nose: Nose normal.      Mouth/Throat:      Mouth: Mucous membranes are moist.      Pharynx: Oropharynx is clear. No oropharyngeal exudate.   Eyes:      General: No scleral icterus.      Conjunctiva/sclera: Conjunctivae normal.      Pupils: Pupils are equal, round, and reactive to light.   Neck:      Vascular: No carotid bruit.   Cardiovascular:      Rate and Rhythm: Normal rate and regular rhythm.      Pulses: Normal pulses.      Heart sounds: Normal heart sounds. No murmur heard.     No gallop.   Pulmonary:      Effort: Pulmonary effort is normal. No respiratory distress.      Breath sounds: Normal breath sounds. No wheezing, rhonchi or rales.   Abdominal:      General: There is no distension.      Palpations: Abdomen is soft. There is no mass.      Tenderness: There is no abdominal tenderness. There is no guarding or rebound.      Hernia: A hernia (Ventral hernia) is present.   Genitourinary:     Comments: Deferred to urology  Musculoskeletal:         General: No swelling or signs of injury. Normal range of motion.      Cervical back: Normal range of motion and neck supple. No tenderness.      Right lower leg: No edema.      Left lower leg: No edema.   Lymphadenopathy:      Cervical: No cervical adenopathy.   Skin:     General: Skin is warm.      Coloration: Skin is not jaundiced.      Findings: No erythema, lesion or rash.   Neurological:      General: No focal deficit present.      Mental Status: He is alert and oriented to person, place, and time. Mental status is at baseline.      Cranial Nerves: No cranial nerve deficit.      Motor: No weakness.      Coordination: Coordination normal.      Gait: Gait normal.   Psychiatric:         Mood and Affect: Mood normal.         Behavior: Behavior normal.         Thought Content: Thought content normal.         Judgment: Judgment normal.         Assessment & Plan          Full age-appropriate comprehensive physical exam and health care maintenance performed and discussed today.  Routine safety and preventative measures discussed including self testicular exam, seatbelt use, no drinking and driving, no texting and driving, abstinence or cessation of  tobacco use, routine dental and vision exams, healthy diet and regular exercise.    We will update comprehensive labs and follow-up on results once available.  AAA screening completed and normal.  Colonoscopy up-to-date from 2022, recommend repeat 5 years.  EKG as above.  Reasonable to proceed with CT cardiac scoring calcium scan.  Order placed.  Tetanus up-to-date from 2023.  Has received RSV vaccine, Prevnar 20, updated COVID booster, annual flu shot.  Has completed shingles vaccine series.    In addition to the above-mentioned healthcare maintenance and screening studies, the following were discussed and assessed in detail today:  1.  Diabetes: Was not tolerating higher doses of Ozempic, now on about 1/3 dose injection, tolerating well.  Following closely with endocrine specialist.  Update A1c and CMP today.  2.  Hyperlipidemia: Again, update comprehensive labs on statin.  Also proceed with CT calcium scoring scan.  3.  Hypertension: Excellent management on current therapy.  Update BMP.  4.  BPH: PSA slightly elevated, follows with urologist, recent MRI prostate looked reassuring.  5.  Ortiz's esophagus: Continues on PPI therapy daily.  6.  Vitamin D deficiency: Update comprehensive labs to check for stability.    Happy to see him back in 6 months for brief follow-up.  He is to contact us with any questions in the interim.

## 2024-11-19 DIAGNOSIS — E11.9 CONTROLLED TYPE 2 DIABETES MELLITUS WITHOUT COMPLICATION, WITHOUT LONG-TERM CURRENT USE OF INSULIN (MULTI): ICD-10-CM

## 2024-11-19 RX ORDER — METFORMIN HYDROCHLORIDE 500 MG/1
TABLET, EXTENDED RELEASE ORAL
Qty: 360 TABLET | Refills: 1 | Status: SHIPPED | OUTPATIENT
Start: 2024-11-19

## 2024-11-20 DIAGNOSIS — Z79.2 PROPHYLACTIC ANTIBIOTIC: ICD-10-CM

## 2024-11-20 RX ORDER — CIPROFLOXACIN 500 MG/1
TABLET ORAL
Qty: 4 TABLET | Refills: 0 | Status: CANCELLED | OUTPATIENT
Start: 2024-11-20

## 2024-11-28 RX ORDER — SULFAMETHOXAZOLE AND TRIMETHOPRIM 800; 160 MG/1; MG/1
1 TABLET ORAL 2 TIMES DAILY
Qty: 14 TABLET | Refills: 0 | Status: SHIPPED | OUTPATIENT
Start: 2024-11-28 | End: 2024-11-28 | Stop reason: ALTCHOICE

## 2024-11-28 RX ORDER — SULFAMETHOXAZOLE AND TRIMETHOPRIM 800; 160 MG/1; MG/1
1 TABLET ORAL 2 TIMES DAILY
Qty: 6 TABLET | Refills: 0 | Status: SHIPPED | OUTPATIENT
Start: 2024-11-28

## 2024-12-01 DIAGNOSIS — H10.9 CONJUNCTIVITIS, UNSPECIFIED CONJUNCTIVITIS TYPE, UNSPECIFIED LATERALITY: Primary | ICD-10-CM

## 2024-12-01 RX ORDER — OFLOXACIN 3 MG/ML
1 SOLUTION/ DROPS OPHTHALMIC 4 TIMES DAILY
Qty: 5 ML | Refills: 0 | Status: SHIPPED | OUTPATIENT
Start: 2024-12-01 | End: 2024-12-06

## 2024-12-15 DIAGNOSIS — Z79.2 PROPHYLACTIC ANTIBIOTIC: ICD-10-CM

## 2024-12-15 RX ORDER — CIPROFLOXACIN 500 MG/1
TABLET ORAL
Qty: 4 TABLET | Refills: 0 | Status: CANCELLED | OUTPATIENT
Start: 2024-12-15

## 2024-12-19 NOTE — PROGRESS NOTES
"  Patient is a 68 y.o. male presenting for prostate biopsy with history of elevated PSA.    SUBJECTIVE:  HPI   He has history of elevated PSA. His PSA was 5.33 in 2024 and 4.94 on 2024.  He had MRI prostate on 2024. He is not taking an blood thinners at this time. He denies any medication allergies today.    He has BPH and continues alfuzosin.      No results found for: \"URINECULTURE\"     Past Medical History:   Diagnosis Date    Choledocholithiasis 2017    Cholelithiasis 2017    Essential hypertension     Pyloric stenosis, congenital (Multi)     Type 2 diabetes mellitus with hyperglycemia, unspecified whether long term insulin use (Multi)       Past Surgical History:   Procedure Laterality Date    CHOLECYSTECTOMY      KNEE ARTHROSCOPY W/ MENISCAL REPAIR      bilateral    PYLOROPLASTY      TONSILLECTOMY        Family History   Problem Relation Name Age of Onset    Heart disease Mother      Asthma Mother      Valvular heart disease Mother      Diabetes Father      Diabetes Maternal Grandfather        Social History     Socioeconomic History    Marital status:    Tobacco Use    Smoking status: Former     Current packs/day: 0.00     Types: Cigarettes     Quit date:      Years since quittin.0    Smokeless tobacco: Never   Substance and Sexual Activity    Alcohol use: Yes     Alcohol/week: 2.0 standard drinks of alcohol     Types: 2 Cans of beer per week     Comment: rarely    Drug use: Defer    Sexual activity: Defer        Review of Systems   Constitutional: denies any unintentional weight loss or change in strength.  Integumentary: denies any rashes or pruritus.  Eyes: denies any double vision or eye pain.  Ear/Nose/Mouth/Throat: denies any nosebleeds or gum bleeds.  Cardiovascular: denies any chest pain or syncope.  Respiratory: denies hemoptysis.  Gastrointestinal: denies nausea or vomiting.  Musculoskeletal: denies muscle cramping or weakness.  Neurologic: " "denies convulsions or seizures.  Hematologic/Lymphatic: denies bleeding tendencies.  Endocrine: denies heat/cold intolerance.  All other systems have been reviewed and are negative unless otherwise noted in the HPI.    OBJECTIVE:  Visit Vitals  /85 (BP Location: Right arm, Patient Position: Sitting, BP Cuff Size: Adult)   Pulse 73   Temp 36.4 °C (97.5 °F)       Physical Exam   Constitutional: No obvious distress.  Eyes: Non-injected conjunctiva, sclera clear, EOMI.  Ears/Nose/Mouth/Throat: No obvious drainage per ears or nose.  Cardiovascular: Extremities are warm and well perfused. No edema, cyanosis or pallor.  Respiratory: No audible wheezing/stridor; respirations do not appear labored.  Gastrointestinal: Abdomen soft, not distended.  Musculoskeletal: Normal ROM of extremities.  Skin: No obvious rashes or open sores.  Neurologic: Alert and oriented, CN 2-12 grossly intact.  Psychiatric: Answers questions appropriately with normal affect.  Hematologic/Lymphatic/Immunologic: No obvious bruises or sites of spontaneous bleeding.  Genitourinary: No CVA tenderness, bladder not palpable. 3+ prostate    Labs:  Lab Results   Component Value Date    WBC 8.4 11/13/2024    HGB 16.4 11/13/2024    HCT 46.2 11/13/2024     11/13/2024    CHOL 126 11/13/2024    TRIG 198 (H) 11/13/2024    HDL 40.4 11/13/2024    ALT 48 11/13/2024    AST 24 11/13/2024     11/13/2024    K 4.1 11/13/2024     11/13/2024    CREATININE 0.88 11/13/2024    BUN 15 11/13/2024    CO2 27 11/13/2024    TSH 2.86 11/13/2024    PSA 4.94 (H) 11/01/2024    HGBA1C 6.0 (H) 11/13/2024    ALBUR 12 03/23/2023     No results found for: \"KPSAT\", \"KPSAP\"  IMAGING:  MRI Prostate  Result date: 11/12/2024  IMPRESSION:  1.  BPH changes of the transition zone. Patchy T2 hypointensities  within the peripheral zone, without evidence of focally restricted  diffusion ( PI-RADS 2).    PROCEDURES:  Biopsy prostate    Date/Time: 12/26/2024 9:16 AM    Performed " by: Nigel Hernandez MD  Authorized by: Nigel Hernandez MD    Universal protocol:     Procedure explained and questions answered to patient or proxy's satisfaction: yes    Procedure specific details:      The patient was placed in the left lateral decubitus position. Viscous lidocaine 2% was instilled into the rectum. A digital rectal exam was performed. No nodules were identified. The transrectal ultrasound probe was inserted. Measurements were obtained. Ultrasound was used for needle guidance. Biopsies were obtained from the right and left base, mid gland, and apex both medially and laterally. The ultrasound probe was removed.  CLARA was negative.  Length:   Width: 5.2 cm   Height: 2.9 cm   Length: 4.4 cm  Volume 36 g    Post-procedure details:     Procedure completion:  Tolerated well, no immediate complications    ASSESSMENT/PLAN:  Problem List Items Addressed This Visit       Elevated PSA - Primary    Relevant Orders    Biopsy prostate    POCT UA Automated manually resulted (Completed)     Other Visit Diagnoses       Prostate cancer (Multi)        Relevant Orders    Surgical Pathology Exam    Prophylactic antibiotic                 He has history of an elevated PSA. His PSA was 4.94 in November 2024 and 5.3 in April 2024.  MRI prostate was negative for any significant PI-RADS lesions on November 12, 2024. He underwent prostate biopsy today.We discussed risks including infection and bleeding, blood in urine, semen or stool. Post-procedure instructions reviewed. He will continue Bactrim for prophylaxis. He will follow up in 6 months.    He has a history of BPH.  He is treated with alfuzosin.    All questions were answered to the patient’s satisfaction.  Patient agrees with the plan and wishes to proceed.  Follow-up will be scheduled appropriately.     Scribe Attestation  By signing my name below, I, Yesi Gavin,   attest that this documentation has been prepared under the direction and in the presence  of Nigel Hernandez MD.

## 2024-12-22 DIAGNOSIS — Z79.2 PROPHYLACTIC ANTIBIOTIC: ICD-10-CM

## 2024-12-26 ENCOUNTER — APPOINTMENT (OUTPATIENT)
Dept: UROLOGY | Facility: CLINIC | Age: 69
End: 2024-12-26
Payer: MEDICARE

## 2024-12-26 VITALS
DIASTOLIC BLOOD PRESSURE: 85 MMHG | TEMPERATURE: 97.5 F | SYSTOLIC BLOOD PRESSURE: 125 MMHG | BODY MASS INDEX: 29.61 KG/M2 | HEIGHT: 72 IN | WEIGHT: 218.6 LBS | HEART RATE: 73 BPM

## 2024-12-26 DIAGNOSIS — C61 PROSTATE CANCER (MULTI): ICD-10-CM

## 2024-12-26 DIAGNOSIS — R97.20 ELEVATED PSA: Primary | ICD-10-CM

## 2024-12-26 DIAGNOSIS — Z79.2 PROPHYLACTIC ANTIBIOTIC: ICD-10-CM

## 2024-12-26 LAB
POC APPEARANCE, URINE: CLEAR
POC BILIRUBIN, URINE: NEGATIVE
POC BLOOD, URINE: NEGATIVE
POC COLOR, URINE: YELLOW
POC GLUCOSE, URINE: ABNORMAL MG/DL
POC KETONES, URINE: NEGATIVE MG/DL
POC LEUKOCYTES, URINE: NEGATIVE
POC NITRITE,URINE: NEGATIVE
POC PH, URINE: 5.5 PH
POC PROTEIN, URINE: NEGATIVE MG/DL
POC SPECIFIC GRAVITY, URINE: 1.02
POC UROBILINOGEN, URINE: 0.2 EU/DL

## 2024-12-26 PROCEDURE — 81003 URINALYSIS AUTO W/O SCOPE: CPT | Performed by: UROLOGY

## 2024-12-26 PROCEDURE — G0416 PROSTATE BIOPSY, ANY MTHD: HCPCS

## 2024-12-26 PROCEDURE — 55700 PR PROSTATE NEEDLE BIOPSY ANY APPROACH: CPT | Performed by: UROLOGY

## 2024-12-26 PROCEDURE — 88344 IMHCHEM/IMCYTCHM EA MLT ANTB: CPT

## 2024-12-26 PROCEDURE — 88344 IMHCHEM/IMCYTCHM EA MLT ANTB: CPT | Performed by: PATHOLOGY

## 2024-12-26 PROCEDURE — 76872 US TRANSRECTAL: CPT | Performed by: UROLOGY

## 2024-12-26 PROCEDURE — 76942 ECHO GUIDE FOR BIOPSY: CPT | Performed by: UROLOGY

## 2024-12-26 PROCEDURE — G0416 PROSTATE BIOPSY, ANY MTHD: HCPCS | Performed by: PATHOLOGY

## 2024-12-26 RX ORDER — SULFAMETHOXAZOLE AND TRIMETHOPRIM 800; 160 MG/1; MG/1
1 TABLET ORAL 2 TIMES DAILY
Qty: 2 TABLET | Refills: 0 | Status: SHIPPED | OUTPATIENT
Start: 2024-12-26 | End: 2024-12-27

## 2024-12-26 ASSESSMENT — PAIN SCALES - GENERAL: PAINLEVEL_OUTOF10: 0-NO PAIN

## 2024-12-27 RX ORDER — SULFAMETHOXAZOLE AND TRIMETHOPRIM 800; 160 MG/1; MG/1
1 TABLET ORAL 2 TIMES DAILY
Qty: 6 TABLET | Refills: 0 | Status: SHIPPED | OUTPATIENT
Start: 2024-12-27

## 2025-01-06 LAB
LAB AP ASR DISCLAIMER: NORMAL
LABORATORY COMMENT REPORT: NORMAL
PATH REPORT.FINAL DX SPEC: NORMAL
PATH REPORT.GROSS SPEC: NORMAL
PATH REPORT.RELEVANT HX SPEC: NORMAL
PATH REPORT.TOTAL CANCER: NORMAL

## 2025-01-12 DIAGNOSIS — E11.9 DIABETES MELLITUS TYPE 2 WITHOUT RETINOPATHY (MULTI): ICD-10-CM

## 2025-01-13 RX ORDER — GLIMEPIRIDE 2 MG/1
2 TABLET ORAL
Qty: 90 TABLET | Refills: 1 | Status: SHIPPED | OUTPATIENT
Start: 2025-01-13 | End: 2026-01-13

## 2025-01-22 LAB
AP SUMMARY REPORT: NORMAL
SCAN RESULT: NORMAL

## 2025-01-23 ENCOUNTER — DOCUMENTATION (OUTPATIENT)
Dept: UROLOGY | Facility: CLINIC | Age: 70
End: 2025-01-23
Payer: MEDICARE

## 2025-02-25 ENCOUNTER — HOSPITAL ENCOUNTER (OUTPATIENT)
Dept: RADIOLOGY | Facility: HOSPITAL | Age: 70
Discharge: HOME | End: 2025-02-25
Payer: MEDICARE

## 2025-02-25 DIAGNOSIS — E78.00 HIGH CHOLESTEROL: ICD-10-CM

## 2025-02-25 DIAGNOSIS — I10 BENIGN ESSENTIAL HYPERTENSION: ICD-10-CM

## 2025-02-25 DIAGNOSIS — E11.9 CONTROLLED TYPE 2 DIABETES MELLITUS WITHOUT COMPLICATION, WITHOUT LONG-TERM CURRENT USE OF INSULIN (MULTI): ICD-10-CM

## 2025-02-25 PROCEDURE — 75571 CT HRT W/O DYE W/CA TEST: CPT

## 2025-02-26 ENCOUNTER — APPOINTMENT (OUTPATIENT)
Dept: ENDOCRINOLOGY | Facility: CLINIC | Age: 70
End: 2025-02-26
Payer: MEDICARE

## 2025-02-27 ENCOUNTER — APPOINTMENT (OUTPATIENT)
Dept: ENDOCRINOLOGY | Facility: CLINIC | Age: 70
End: 2025-02-27
Payer: MEDICARE

## 2025-02-27 ENCOUNTER — TELEPHONE (OUTPATIENT)
Dept: PRIMARY CARE | Facility: CLINIC | Age: 70
End: 2025-02-27

## 2025-02-27 VITALS
DIASTOLIC BLOOD PRESSURE: 87 MMHG | HEART RATE: 67 BPM | HEIGHT: 72 IN | BODY MASS INDEX: 29.53 KG/M2 | WEIGHT: 218 LBS | SYSTOLIC BLOOD PRESSURE: 122 MMHG

## 2025-02-27 DIAGNOSIS — I10 BENIGN ESSENTIAL HYPERTENSION: ICD-10-CM

## 2025-02-27 DIAGNOSIS — E11.65 TYPE 2 DIABETES MELLITUS WITH HYPERGLYCEMIA, UNSPECIFIED WHETHER LONG TERM INSULIN USE (MULTI): Primary | ICD-10-CM

## 2025-02-27 DIAGNOSIS — E78.00 HIGH CHOLESTEROL: ICD-10-CM

## 2025-02-27 LAB — POC HEMOGLOBIN A1C: 6.1 % (ref 4.2–6.5)

## 2025-02-27 PROCEDURE — 99213 OFFICE O/P EST LOW 20 MIN: CPT | Performed by: INTERNAL MEDICINE

## 2025-02-27 PROCEDURE — 83036 HEMOGLOBIN GLYCOSYLATED A1C: CPT | Performed by: INTERNAL MEDICINE

## 2025-02-27 RX ORDER — SEMAGLUTIDE 2.68 MG/ML
2 INJECTION, SOLUTION SUBCUTANEOUS
Qty: 9 ML | Refills: 3 | Status: SHIPPED | OUTPATIENT
Start: 2025-02-27

## 2025-02-27 NOTE — PROGRESS NOTES
Attestation signed by Pepe Costa MD on 2/27/25 at 3:12 PM.    I, Dr Pepe Costa, have reviewed this progress note, medication list, vital signs, any pertinent lab values, and any CGM data if present with the [facility] and  face to face during this visit today. This note reflects the treatment plan that was made under my direction after reviewing the above mentioned elements while face to face with the patient and CDE.  I personally answered and addressed any questions and concerns the patient had during the visit today.  The CDE entered the data in this note under my direction and I personally reviewed it, signed any lab or medication orders that I instructed to be completed. I am the billing provider for this visit and the level of service was determined by my involvement in the Medical Decision Making Component of this visit while face to face with the patient.

## 2025-02-27 NOTE — PROGRESS NOTES
"Subjective   Patient ID: Jakob Dyer \"Jono\" is a 69 y.o. male who presents for Diabetes.  HPI  67 yo with Diabetes 2 (dx in mid 50's) , HTN, Dyslipidemia, ED, Fatty liver dz, raven's esophagus presents for followup.   Last A1C- 6.5%, today 6.1%.     Pt is testing sugars <1 times per day. Pt is having low sugars 0 times/week. Pt's typical blood sugars are running mid 100's often in afternoon, not testing at different times of day.  Pt is struggling following a carb controlled diet and knows reasonable carb allowances. Pt is able to afford their medication for the most part. Pt is not as active recently.     Taking metformin 500mg ER - 2 tabs daily, farxiga 10mg - 1/2 tab daily, adri 15mg, Ozempic 2 mg pen - 27 clicks weekly (tried to increase to 36 clicks, had GI issues), and glimepiride 2mg daily.   - cost is an issue as pt on medicare, doesn't qualify for  PAP     Taking losartan 50mg and propranolol 40mg BID for htn.  Taking simvastatin 40mg qhs for lipids and tolerating.    Pt had CT cardiac scoring earlier this week, reports his coronary artery calcium score is high, he is waiting to hear back from PCP about next steps (he is quite stressed about this today).     Current Outpatient Medications:     alfuzosin (Uroxatral) 10 mg 24 hr tablet, Take 1 tablet (10 mg) by mouth once daily at bedtime., Disp: 90 tablet, Rfl: 3    Farxiga 10 mg, Take 1 tablet (10 mg) by mouth once daily., Disp: 90 tablet, Rfl: 1    glimepiride (Amaryl) 2 mg tablet, TAKE 1 TABLET (2 MG) BY MOUTH ONCE DAILY IN THE MORNING. TAKE BEFORE MEALS., Disp: 90 tablet, Rfl: 1    lansoprazole (Prevacid) 15 mg DR capsule, Take 1 capsule (15 mg) by mouth once daily., Disp: , Rfl:     losartan (Cozaar) 50 mg tablet, TAKE 1 TABLET BY MOUTH EVERY DAY, Disp: 90 tablet, Rfl: 2    metFORMIN  mg 24 hr tablet, TAKE 4 TABS BY MOUTH DAILY, Disp: 360 tablet, Rfl: 1    pen needle, diabetic 32 gauge x 5/32\" needle, once daily. For 90, Disp: , Rfl:     " pioglitazone (Actos) 15 mg tablet, TAKE 1 TABLET BY MOUTH EVERY DAY, Disp: 90 tablet, Rfl: 3    propranolol (Inderal) 40 mg tablet, TAKE AS DIRECTED ORALLY TWICE A DAY, Disp: 180 tablet, Rfl: 1    semaglutide (Ozempic) 2 mg/dose (8 mg/3 mL) pen injector, Inject 2 mg under the skin every 7 days., Disp: 9 mL, Rfl: 3    simvastatin (Zocor) 40 mg tablet, TAKE 1 TABLET BY MOUTH EVERY DAY IN THE EVENING, Disp: 90 tablet, Rfl: 3    sulfamethoxazole-trimethoprim (Bactrim DS) 800-160 mg tablet, TAKE 1 TABLET BY MOUTH TWICE A DAY, Disp: 6 tablet, Rfl: 0    testosterone cypionate (Depo-Testosterone) 100 mg/mL injection, Inject into the muscle. (Patient not taking: Reported on 12/26/2024), Disp: , Rfl:    No Known Allergies    Review of Systems  See HPI.     Objective   /87   Pulse 67   Ht 1.829 m (6')   Wt 98.9 kg (218 lb)   BMI 29.57 kg/m²     Labs:   Lab Results   Component Value Date    HGBA1C 6.1 02/27/2025     Lab Results   Component Value Date    CALCIUM 9.6 11/13/2024    AST 24 11/13/2024    ALKPHOS 89 11/13/2024    BILITOT 1.6 (H) 11/13/2024    PROT 7.0 11/13/2024    ALBUMIN 4.3 11/13/2024    GLOB 3.0 03/23/2023    AGR 1.3 (L) 03/23/2023     11/13/2024    K 4.1 11/13/2024     11/13/2024    CO2 27 11/13/2024    ANIONGAP 12 11/13/2024    BUN 15 11/13/2024    CREATININE 0.88 11/13/2024    UREACREAUR 12.5 03/23/2023    GLUCOSE 180 (H) 11/13/2024    ALT 48 11/13/2024    EGFR >90 11/13/2024     Lab Results   Component Value Date    WBC 8.4 11/13/2024    NRBC 0.0 11/13/2024    RBC 5.03 11/13/2024    HGB 16.4 11/13/2024    HCT 46.2 11/13/2024     11/13/2024     Lab Results   Component Value Date    CHOL 126 11/13/2024    TRIG 198 (H) 11/13/2024    HDL 40.4 11/13/2024    LDLCALC 46 11/13/2024     Lab Results   Component Value Date    CREATU 118.0 11/13/2024    MICROALBCREA  11/13/2024      Comment:      One or more analytes used in this calculation is outside of the analytical measurement range.  "Calculation cannot be performed.     Lab Results   Component Value Date    TSH 2.86 11/13/2024     Lab Results   Component Value Date    UMRHDOVJ83 >2,000 (H) 11/13/2024     Lab Results   Component Value Date    VITD25 21 (L) 11/13/2024     No results found for: \"PTH\"  No results found for: \"MG\"    Assessment    1. Type 2 diabetes mellitus with hyperglycemia, unspecified whether long term insulin use (Multi)    2. High cholesterol    3. Benign essential hypertension        Medical Decision Making  Complexity of problem: Chronic illness of diabetes mellitus uncontrolled, progressing  Data analyzed and reviewed: Reviewed prior notes, blood glucose data, labs including HgbA1c, lipids, serum chemistries.  Ordered tests.   Risk of complications and morbidities: Is definite because of use of insulin and risk of hypoglycemia.  Prescription medications reviewed and modifications made.  Compliance assessed.  Addressed social determinants of health including food insecurity.    Plan   1. Type 2 diabetes mellitus with hyperglycemia, unspecified whether long term insulin use (Multi) (Primary)  - POCT glycosylated hemoglobin (Hb A1C) manually resulted    -A1c ordered and reviewed.   -Reported SMBG data reviewed.   -Labs reviewed.     -Overall maintains good glycemic control on current regimen.   -Pt unable to tolerated higher dose of Ozempic - continue 27 clicks on the 2mg pen.   -Cost remains an issue with his prescriptions, he does not qualify for  PAP - pt to continue cutting Farxiga in half to help with cost.   -Work on following low carb diet and testing sugars once daily at different times of the day.   -Continue current regimen - if pt starts having more than 1 low sugar a week, then he can stop glimepiride.     -Reviewed CT cardiac score - advised pt his cholesterol is optimized on statin therapy and he is also on Ozempic and Farxiga that offer cardiac benefits; will await further instructions from PCP.     2. High " cholesterol  -On statin and tolerating.   -LDL at target.   -Continue current therapy.     3. Benign essential hypertension  -BP at target today.   -Continue current therapy.       -labs/tests/notes reviewed  -reviewed and counseled patient on medication monitoring and side effects    Follow Up: BB 6 months     Treatment and plan discussed with Dr. Pepe Costa.   MELY Young, PharmD, BCACP, CDCES.

## 2025-02-27 NOTE — PATIENT INSTRUCTIONS
Your A1c was 6.1% today - great work!     Continue all meds as ordered.     If you start to have more low sugars (more than 1 a week), then you can stop the glimepiride.     Follow up with Dr. Costa in 6 months - call if you need anything sooner.

## 2025-03-03 DIAGNOSIS — R93.1 ELEVATED CORONARY ARTERY CALCIUM SCORE: Primary | ICD-10-CM

## 2025-03-08 ENCOUNTER — OFFICE VISIT (OUTPATIENT)
Dept: CARDIOLOGY | Facility: CLINIC | Age: 70
End: 2025-03-08
Payer: MEDICARE

## 2025-03-08 VITALS
OXYGEN SATURATION: 95 % | WEIGHT: 218 LBS | BODY MASS INDEX: 29.57 KG/M2 | HEART RATE: 79 BPM | SYSTOLIC BLOOD PRESSURE: 149 MMHG | DIASTOLIC BLOOD PRESSURE: 89 MMHG

## 2025-03-08 DIAGNOSIS — I10 BENIGN ESSENTIAL HYPERTENSION: ICD-10-CM

## 2025-03-08 DIAGNOSIS — R93.1 ELEVATED CORONARY ARTERY CALCIUM SCORE: ICD-10-CM

## 2025-03-08 PROCEDURE — 1123F ACP DISCUSS/DSCN MKR DOCD: CPT | Performed by: STUDENT IN AN ORGANIZED HEALTH CARE EDUCATION/TRAINING PROGRAM

## 2025-03-08 PROCEDURE — 4010F ACE/ARB THERAPY RXD/TAKEN: CPT | Performed by: STUDENT IN AN ORGANIZED HEALTH CARE EDUCATION/TRAINING PROGRAM

## 2025-03-08 PROCEDURE — 93005 ELECTROCARDIOGRAM TRACING: CPT | Performed by: STUDENT IN AN ORGANIZED HEALTH CARE EDUCATION/TRAINING PROGRAM

## 2025-03-08 PROCEDURE — 3079F DIAST BP 80-89 MM HG: CPT | Performed by: STUDENT IN AN ORGANIZED HEALTH CARE EDUCATION/TRAINING PROGRAM

## 2025-03-08 PROCEDURE — 1159F MED LIST DOCD IN RCRD: CPT | Performed by: STUDENT IN AN ORGANIZED HEALTH CARE EDUCATION/TRAINING PROGRAM

## 2025-03-08 PROCEDURE — 3077F SYST BP >= 140 MM HG: CPT | Performed by: STUDENT IN AN ORGANIZED HEALTH CARE EDUCATION/TRAINING PROGRAM

## 2025-03-08 PROCEDURE — 99214 OFFICE O/P EST MOD 30 MIN: CPT | Performed by: STUDENT IN AN ORGANIZED HEALTH CARE EDUCATION/TRAINING PROGRAM

## 2025-03-08 PROCEDURE — 99204 OFFICE O/P NEW MOD 45 MIN: CPT | Performed by: STUDENT IN AN ORGANIZED HEALTH CARE EDUCATION/TRAINING PROGRAM

## 2025-03-08 PROCEDURE — 1126F AMNT PAIN NOTED NONE PRSNT: CPT | Performed by: STUDENT IN AN ORGANIZED HEALTH CARE EDUCATION/TRAINING PROGRAM

## 2025-03-08 ASSESSMENT — PAIN SCALES - GENERAL: PAINLEVEL_OUTOF10: 0-NO PAIN

## 2025-03-08 ASSESSMENT — ENCOUNTER SYMPTOMS
NEAR-SYNCOPE: 0
CLAUDICATION: 0
SHORTNESS OF BREATH: 0
SYNCOPE: 0
DYSPNEA ON EXERTION: 0
ORTHOPNEA: 0
PALPITATIONS: 0
PND: 0
IRREGULAR HEARTBEAT: 0

## 2025-03-08 NOTE — PROGRESS NOTES
"                        [facility] - General Cardiology Note                                                                                        Reason for Visit: New Patient Visit (CT scan results).  Referring Clinician: Maria D     History of Present Illness  Jakob Dyer \"José Manuel" is a 69 y.o. male with history of HTN, HLD, and well-controlled type II diabetes, who presents for New Patient Visit (CT scan results).    List of Active Cardiac Issues:  # HTN: BP is well-controlled at home on losartan 50mg daily, and propranolol 40mg BID.   # Type II diabetes: no complications. A1c 6.1% on ozempic 2 mg weekly, Farxiga 5 mg daily, amaryl 2 mg daily, and metformin 1000 XL daily.   # Hyperlipidemia: 3/23/2025 , LDL 43, non-HDL Chol 68, , on simvastatin 40mg daily   # Elevated CAC score: 1485AU in 2/25/2025.     Interval History:  Mr. Dyer reports feeling very good overall. He lives in a multistory house and able to climb up to the bathroom in the second level without difficulty. He is able to walk on ground level for 1.5 miles without stopping for chest pain or shortness of breath. No hx of syncope, pre-syncope, LE swelling, orthopnea, PND, or other concerning symptoms.     Past Medical History  He has a past medical history of Choledocholithiasis (09/25/2017), Cholelithiasis (09/14/2017), Essential hypertension, Pyloric stenosis, congenital (Multi), and Type 2 diabetes mellitus with hyperglycemia, unspecified whether long term insulin use (Multi).    Past Surgical History  He has a past surgical history that includes Knee arthroscopy w/ meniscal repair; Cholecystectomy; Pyloroplasty; and Tonsillectomy.    Social History  He reports that he quit smoking about 45 years ago. His smoking use included cigarettes. He has never used smokeless tobacco. He reports current alcohol use of about 2.0 standard drinks of alcohol per week. Drug use questions deferred to the physician.    Family " "History  Family History   Problem Relation Name Age of Onset    Heart disease Mother      Asthma Mother      Valvular heart disease Mother      Diabetes Father      Diabetes Maternal Grandfather         Allergies  Patient has no known allergies.    Outpatient Medications  Current Outpatient Medications   Medication Instructions    alfuzosin (UROXATRAL) 10 mg, oral, Nightly    Farxiga 10 mg, oral, Daily    glimepiride (AMARYL) 2 mg, oral, Daily before breakfast    lansoprazole (PREVACID) 15 mg, Daily    losartan (COZAAR) 50 mg, oral, Daily    metFORMIN  mg 24 hr tablet TAKE 4 TABS BY MOUTH DAILY    Ozempic 2 mg, subcutaneous, Every 7 days    pen needle, diabetic 32 gauge x 5/32\" needle Daily    pioglitazone (ACTOS) 15 mg, oral, Daily    propranolol (Inderal) 40 mg tablet TAKE AS DIRECTED ORALLY TWICE A DAY    simvastatin (Zocor) 40 mg tablet TAKE 1 TABLET BY MOUTH EVERY DAY IN THE EVENING    sulfamethoxazole-trimethoprim (Bactrim DS) 800-160 mg tablet 1 tablet, oral, 2 times daily    testosterone cypionate (Depo-Testosterone) 100 mg/mL injection Inject into the muscle.       Review of Systems  Review of Systems   Cardiovascular:  Negative for chest pain, claudication, cyanosis, dyspnea on exertion, irregular heartbeat, leg swelling, near-syncope, orthopnea, palpitations, paroxysmal nocturnal dyspnea and syncope.   Respiratory:  Negative for shortness of breath.    All other systems reviewed and are negative.      Last Recorded Vitals  There were no vitals filed for this visit.    Physical Examination  General: Well appearing, well-nourished, in no acute distress.  HEENT: Normocephalic atraumatic, pupils equal and reactive to light, extraocular muscles intact, no conjunctival injection, oropharynx clear without exudates.  Neck: Normal carotid arterial pulses, no arterial bruits, no thyromegaly.  Cardiac: Regular rhythm and normal heart rate.  S1, S2 present and normal.  No murmurs, rubs, or gallops.  PMI is " "nondisplaced. Jugular venous pulsations are normal.  Pulmonary: Normal breath sounds, no increased work of breathing, no wheezes or crackles.  GI: Normal bowel sounds, abdominal aorta not enlarged, no hepatosplenomegaly, no abdominal bruits.  Lower extremities: No cyanosis, clubbing, or edema.  No xanthelasma present. Normal distal pulses.  Skin: Skin intact. No significant rashes or lesions present.  Neuro: Alert and oriented x 3, normal attention and cognition, no focal motor or sensory neurologic deficits.  Psych: Normal affect and mood.  Musculoskeletal: Normal gait normal muscle tone.    Laboratory Studies  Lab Results   Component Value Date    GLUCOSE 180 (H) 11/13/2024    CALCIUM 9.6 11/13/2024     11/13/2024    K 4.1 11/13/2024    CO2 27 11/13/2024     11/13/2024    BUN 15 11/13/2024    CREATININE 0.88 11/13/2024     Lab Results   Component Value Date    ALT 48 11/13/2024    AST 24 11/13/2024    ALKPHOS 89 11/13/2024    BILITOT 1.6 (H) 11/13/2024         Lab Results   Component Value Date    CHOL 126 11/13/2024    CHOL 122 04/17/2024    CHOL 106 (L) 03/23/2023     Lab Results   Component Value Date    HDL 40.4 11/13/2024    HDL 38.2 04/17/2024    HDL 38 (L) 03/23/2023     Lab Results   Component Value Date    LDLCALC 46 11/13/2024    LDLCALC 54 04/17/2024    LDLCALC 43 (L) 03/23/2023     Lab Results   Component Value Date    TRIG 198 (H) 11/13/2024    TRIG 147 04/17/2024    TRIG 124 03/23/2023     No components found for: \"CHOLHDL\"  Lab Results   Component Value Date    HGBA1C 6.1 02/27/2025     No components found for: \"UACR\"    Cardiology Tests  ECG:   ECG 12 lead (Clinic Performed) today: Normal sinus rhythm with occasional PACs.     Cardiac Imaging:   CT cardiac scoring wo IV contrast 02/25/2025    I personally reviewed the imaging studies and reviewed them with the patient.       Assessment and Plan  Jakob Dyer \"Jono\" is a 69 y.o. male with history of HTN, HLD, and well-controlled type " II diabetes, who presents for elevated calcium score of 1485 UA. Overall, his cardiometabolic risk factors are well-controlled, including his lipid profile. Overall, we would recommend continuing current management. Reassurance was provided to the patient.     List of Active Cardiac Issues:  # HTN: BP is well-controlled at home on losartan 50mg daily, and propranolol 40mg BID.   # Type II diabetes: no complications. A1c 6.1% on ozempic 2 mg weekly, Farxiga 5 mg daily, amaryl 2 mg daily, and metformin 1000 XL daily.   # Hyperlipidemia: 3/23/2025 , LDL 43, non-HDL Chol 68, ,   - at goal, continue simvastatin 40mg daily   # Elevated CAC score: 1485AU in 2/25/2025.   - We would NOT recommend repeating CT CAC in the future.   - Consider getting Lp(a) for further risk-stratification and potential newly available medications in the future.     Problem List Items Addressed This Visit          Cardiac and Vasculature    Benign essential hypertension     Other Visit Diagnoses       Elevated coronary artery calcium score                      Janet Koroma MD

## 2025-03-11 LAB
ATRIAL RATE: 73 BPM
P AXIS: 37 DEGREES
P OFFSET: 180 MS
P ONSET: 146 MS
PR INTERVAL: 148 MS
Q ONSET: 220 MS
QRS COUNT: 13 BEATS
QRS DURATION: 96 MS
QT INTERVAL: 388 MS
QTC CALCULATION(BAZETT): 427 MS
QTC FREDERICIA: 414 MS
R AXIS: 52 DEGREES
T AXIS: 44 DEGREES
T OFFSET: 414 MS
VENTRICULAR RATE: 73 BPM

## 2025-03-20 DIAGNOSIS — E11.9 CONTROLLED TYPE 2 DIABETES MELLITUS WITHOUT COMPLICATION, WITHOUT LONG-TERM CURRENT USE OF INSULIN (MULTI): ICD-10-CM

## 2025-03-20 RX ORDER — DAPAGLIFLOZIN 10 MG/1
10 TABLET, FILM COATED ORAL DAILY
Qty: 90 TABLET | Refills: 1 | Status: SHIPPED | OUTPATIENT
Start: 2025-03-20

## 2025-03-31 DIAGNOSIS — E11.9 DIABETES MELLITUS TYPE 2 WITHOUT RETINOPATHY (MULTI): ICD-10-CM

## 2025-03-31 RX ORDER — SIMVASTATIN 40 MG/1
TABLET, FILM COATED ORAL
Qty: 90 TABLET | Refills: 3 | Status: SHIPPED | OUTPATIENT
Start: 2025-03-31

## 2025-04-07 ENCOUNTER — APPOINTMENT (OUTPATIENT)
Dept: OPHTHALMOLOGY | Facility: CLINIC | Age: 70
End: 2025-04-07
Payer: MEDICARE

## 2025-04-07 DIAGNOSIS — H52.4 BILATERAL PRESBYOPIA: ICD-10-CM

## 2025-04-07 DIAGNOSIS — H25.13 AGE-RELATED NUCLEAR CATARACT OF BOTH EYES: ICD-10-CM

## 2025-04-07 DIAGNOSIS — E11.9 TYPE 2 DIABETES MELLITUS WITHOUT RETINOPATHY (MULTI): Primary | ICD-10-CM

## 2025-04-07 PROCEDURE — 92014 COMPRE OPH EXAM EST PT 1/>: CPT | Performed by: STUDENT IN AN ORGANIZED HEALTH CARE EDUCATION/TRAINING PROGRAM

## 2025-04-07 PROCEDURE — 92015 DETERMINE REFRACTIVE STATE: CPT | Performed by: STUDENT IN AN ORGANIZED HEALTH CARE EDUCATION/TRAINING PROGRAM

## 2025-04-07 ASSESSMENT — TONOMETRY
OS_IOP_MMHG: 14
IOP_METHOD: GOLDMANN APPLANATION
OD_IOP_MMHG: 15

## 2025-04-07 ASSESSMENT — SLIT LAMP EXAM - LIDS
COMMENTS: GOOD POSITION
COMMENTS: GOOD POSITION

## 2025-04-07 ASSESSMENT — CONF VISUAL FIELD
OD_INFERIOR_NASAL_RESTRICTION: 0
OD_SUPERIOR_TEMPORAL_RESTRICTION: 0
OD_NORMAL: 1
OD_SUPERIOR_NASAL_RESTRICTION: 0
OS_NORMAL: 1
OS_INFERIOR_TEMPORAL_RESTRICTION: 0
OD_INFERIOR_TEMPORAL_RESTRICTION: 0
OS_SUPERIOR_NASAL_RESTRICTION: 0
OS_INFERIOR_NASAL_RESTRICTION: 0
OS_SUPERIOR_TEMPORAL_RESTRICTION: 0

## 2025-04-07 ASSESSMENT — REFRACTION_MANIFEST
OS_ADD: +2.25
OD_ADD: +2.25
OD_CYLINDER: -0.50
OD_AXIS: 085
OD_SPHERE: +1.25
OS_CYLINDER: SPHER
OS_SPHERE: +0.50

## 2025-04-07 ASSESSMENT — CUP TO DISC RATIO
OS_RATIO: .3
OD_RATIO: .3

## 2025-04-07 ASSESSMENT — ENCOUNTER SYMPTOMS
EYES NEGATIVE: 1
RESPIRATORY NEGATIVE: 0
ENDOCRINE NEGATIVE: 0
CARDIOVASCULAR NEGATIVE: 0
NEUROLOGICAL NEGATIVE: 0
MUSCULOSKELETAL NEGATIVE: 0
PSYCHIATRIC NEGATIVE: 0
GASTROINTESTINAL NEGATIVE: 0
ALLERGIC/IMMUNOLOGIC NEGATIVE: 0
HEMATOLOGIC/LYMPHATIC NEGATIVE: 0
CONSTITUTIONAL NEGATIVE: 0

## 2025-04-07 ASSESSMENT — VISUAL ACUITY
OD_SC: 20/30
OS_SC+: -1
METHOD: SNELLEN - LINEAR
OD_SC+: -1
OS_SC: 20/25

## 2025-04-07 ASSESSMENT — REFRACTION_WEARINGRX
OS_SPHERE: OTC READERS +2.50
OD_SPHERE: OTC READERS +2.50

## 2025-04-07 ASSESSMENT — EXTERNAL EXAM - LEFT EYE: OS_EXAM: DERMATOCHALASIS

## 2025-04-07 ASSESSMENT — EXTERNAL EXAM - RIGHT EYE: OD_EXAM: DERMATOCHALASIS

## 2025-04-07 NOTE — PROGRESS NOTES
Assessment/Plan   Diagnoses and all orders for this visit:  Type 2 diabetes mellitus without retinopathy (Multi)  -no retinopathy observed on exam today od/os, pt ed to continue good BGlc, blood pressure and lipid control, rtc with any changes in vision, otherwise monitor 1 year  -1 isolated dot hemorrhage right eye  Age-related nuclear cataract of both eyes  Bilateral presbyopia  -New spec rx released today per patient request. Ocular health wnl for age OU. Monitor 1 year or sooner prn. Refraction billed today.  -Cataracts are mild non visually significant  -Patient would like to transition from NVO RX to full time Bifocal    RTC 1 year for annual with DFE and MRX

## 2025-04-12 DIAGNOSIS — E11.65 TYPE 2 DIABETES MELLITUS WITH HYPERGLYCEMIA, UNSPECIFIED WHETHER LONG TERM INSULIN USE (MULTI): ICD-10-CM

## 2025-04-14 RX ORDER — LOSARTAN POTASSIUM 50 MG/1
50 TABLET ORAL DAILY
Qty: 90 TABLET | Refills: 2 | Status: SHIPPED | OUTPATIENT
Start: 2025-04-14

## 2025-05-18 DIAGNOSIS — E11.9 CONTROLLED TYPE 2 DIABETES MELLITUS WITHOUT COMPLICATION, WITHOUT LONG-TERM CURRENT USE OF INSULIN: ICD-10-CM

## 2025-05-19 RX ORDER — METFORMIN HYDROCHLORIDE 500 MG/1
2000 TABLET, EXTENDED RELEASE ORAL DAILY
Qty: 360 TABLET | Refills: 1 | Status: SHIPPED | OUTPATIENT
Start: 2025-05-19

## 2025-05-26 DIAGNOSIS — I10 BENIGN ESSENTIAL HYPERTENSION: ICD-10-CM

## 2025-05-26 RX ORDER — PROPRANOLOL HYDROCHLORIDE 40 MG/1
TABLET ORAL
Qty: 180 TABLET | Refills: 1 | Status: SHIPPED | OUTPATIENT
Start: 2025-05-26

## 2025-06-04 NOTE — PROGRESS NOTES
Patient is a 69 y.o. male presenting for followup with [facility] of an elevated PSA, and BPH.    SUBJECTIVE:  HPI   He presents for followup.  He states he has increased urinary frequency with moderate volumes.   His last PSA was 4.94 in 11/24.    He has BPH and continues alfuzosin.      Medical History[1]  Surgical History[2]  Family History[3]  Social History[4]    Review of Systems   All systems have been reviewed and are negative unless otherwise noted in the HPI.    OBJECTIVE:  There were no vitals taken for this visit.    Physical Exam   Constitutional: No obvious distress.  Cardiovascular: Extremities are warm and well perfused. No edema, cyanosis or pallor.  Respiratory: No audible wheezing/stridor; respirations do not appear labored.  Neurologic: Alert and oriented, CN 2-12 grossly intact.  Genitourinary: No CVA tenderness, bladder not palpable.   No scrotal masses or tenderness. No penile lesions. CLARA: prostate 3+  without nodules or tenderness.      Labs:  Lab Results   Component Value Date    WBC 8.4 11/13/2024    HGB 16.4 11/13/2024    HCT 46.2 11/13/2024    MCV 92 11/13/2024     11/13/2024    GLUCOSE 180 (H) 11/13/2024    CALCIUM 9.6 11/13/2024     11/13/2024    K 4.1 11/13/2024    CO2 27 11/13/2024     11/13/2024    BUN 15 11/13/2024    CREATININE 0.88 11/13/2024    EGFR >90 11/13/2024    URICACID 3.5 (L) 11/13/2024    PSA 4.94 (H) 11/01/2024   SURGICAL PATHOLOGY:  Prostate biopsy:   FINAL DIAGNOSIS   A. PROSTATE, BIOPSY, LEFT APEX:   Benign prostatic tissue with chronic inflammation     B. PROSTATE, BIOPSY, LEFT MID:   Benign prostatic tissue      C. PROSTATE, BIOPSY, LEFT BASE:   Benign prostatic tissue      D. PROSTATE, BIOPSY, LEFT LATERAL APEX:   Benign prostatic tissue      E. PROSTATE, BIOPSY, LEFT LATERAL MID:   Benign prostatic tissue      F. PROSTATE, BIOPSY, LEFT LATERAL BASE:   Benign prostatic tissue      G. 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     H. PROSTATE, BIOPSY, RIGHT MID:   High-grade prostatic intraepithelial neoplasia     I. PROSTATE, BIOPSY, RIGHT BASE:   Benign prostatic tissue      J. PROSTATE, BIOPSY, RIGHT LATERAL APEX:   Benign prostatic tissue      K. PROSTATE, BIOPSY, RIGHT LATERAL MID:   Benign prostatic tissue      L. PROSTATE, BIOPSY, RIGHT LATERAL BASE:   High-grade prostatic intraepithelial neoplasia      Electronically signed by Zenon Dimas DO on 1/6/2025 at 1035 EST     IMAGING:  MRI Prostate  Result date: 11/12/2024  IMPRESSION:  1.  BPH changes of the transition zone. Patchy T2 hypointensities  within the peripheral zone, without evidence of focally restricted  diffusion ( PI-RADS 2).    PROCEDURES:  PVR: 19 mL  6/5/25    ASSESSMENT/PLAN:  Problem List Items Addressed This Visit    None  Visit Diagnoses         Prostate cancer (Multi)        Relevant Orders    PSA      Urinary symptom or sign        Relevant Orders    Measure post void residual (Completed)    POCT UA Automated manually resulted (Completed)          He has history of an elevated PSA. His PSA was 4.94 in November 2024 and 5.3 in April 2024.  MRI prostate was negative for any significant PI-RADS lesions on November 12, 2024. Pathology identified high-grade PIN on 2 biopsy sites. Confirm MDx in 1/25 identified 33% likelihood of prostate cancer on repeat biopsy with a 20% likelihood of low-grade disease and 13% likelihood of intermediate or high-grade disease. PSA today. CLARA negative. RTC in 6 months with PSA prior.    PSA summary  11/01/24 4.94   04/17/24 5.33  08/11/21 1.9  04/29/20 2.2    He has a history of BPH.  He is treated with alfuzosin.  He has had increased urinary frequency with moderate volumes. He has declined medical management today and will monitor his symptoms.    He had mild retention of 19 mL  today (25). This will be monitored.     Urinalysis identified glucosuria today, otherwise negative.    All questions were answered to the patient’s satisfaction.  Patient agrees with the plan and wishes to proceed.  Follow-up will be scheduled appropriately.     Scribe Attestation  By signing my name below, I, Libby Yesi Isabel,   attest that this documentation has been prepared under the direction and in the presence of Nigel Hernandez MD.          [1]   Past Medical History:  Diagnosis Date    Choledocholithiasis 2017    Cholelithiasis 2017    Essential hypertension     Pyloric stenosis, congenital (Multi)     Type 2 diabetes mellitus with hyperglycemia, unspecified whether long term insulin use (Multi)    [2]   Past Surgical History:  Procedure Laterality Date    CHOLECYSTECTOMY      KNEE ARTHROSCOPY W/ MENISCAL REPAIR      bilateral    PYLOROPLASTY      TONSILLECTOMY     [3]   Family History  Problem Relation Name Age of Onset    Heart disease Mother      Asthma Mother      Valvular heart disease Mother      Diabetes Father      Diabetes Maternal Grandfather     [4]   Social History  Tobacco Use    Smoking status: Former     Current packs/day: 0.00     Types: Cigarettes     Quit date:      Years since quittin.4    Smokeless tobacco: Never   Substance Use Topics    Alcohol use: Yes     Alcohol/week: 2.0 standard drinks of alcohol     Types: 2 Cans of beer per week     Comment: rarely    Drug use: Defer

## 2025-06-05 ENCOUNTER — APPOINTMENT (OUTPATIENT)
Dept: UROLOGY | Facility: CLINIC | Age: 70
End: 2025-06-05
Payer: MEDICARE

## 2025-06-05 DIAGNOSIS — R35.0 BENIGN PROSTATIC HYPERPLASIA WITH URINARY FREQUENCY: Primary | ICD-10-CM

## 2025-06-05 DIAGNOSIS — R39.9 URINARY SYMPTOM OR SIGN: ICD-10-CM

## 2025-06-05 DIAGNOSIS — N40.1 BENIGN PROSTATIC HYPERPLASIA WITH URINARY FREQUENCY: Primary | ICD-10-CM

## 2025-06-05 DIAGNOSIS — C61 PROSTATE CANCER (MULTI): ICD-10-CM

## 2025-06-05 DIAGNOSIS — R97.20 ELEVATED PSA: ICD-10-CM

## 2025-06-05 PROCEDURE — 1160F RVW MEDS BY RX/DR IN RCRD: CPT | Performed by: UROLOGY

## 2025-06-05 PROCEDURE — 99213 OFFICE O/P EST LOW 20 MIN: CPT | Performed by: UROLOGY

## 2025-06-05 PROCEDURE — 1159F MED LIST DOCD IN RCRD: CPT | Performed by: UROLOGY

## 2025-06-05 PROCEDURE — 4010F ACE/ARB THERAPY RXD/TAKEN: CPT | Performed by: UROLOGY

## 2025-06-05 PROCEDURE — 1036F TOBACCO NON-USER: CPT | Performed by: UROLOGY

## 2025-06-05 PROCEDURE — 81003 URINALYSIS AUTO W/O SCOPE: CPT | Performed by: UROLOGY

## 2025-06-05 PROCEDURE — 1126F AMNT PAIN NOTED NONE PRSNT: CPT | Performed by: UROLOGY

## 2025-06-05 PROCEDURE — G2211 COMPLEX E/M VISIT ADD ON: HCPCS | Performed by: UROLOGY

## 2025-06-05 PROCEDURE — 3044F HG A1C LEVEL LT 7.0%: CPT | Performed by: UROLOGY

## 2025-06-05 ASSESSMENT — PAIN SCALES - GENERAL: PAINLEVEL_OUTOF10: 0-NO PAIN

## 2025-06-26 ENCOUNTER — APPOINTMENT (OUTPATIENT)
Dept: UROLOGY | Facility: CLINIC | Age: 70
End: 2025-06-26
Payer: MEDICARE

## 2025-07-05 DIAGNOSIS — E11.9 DIABETES MELLITUS TYPE 2 WITHOUT RETINOPATHY (MULTI): ICD-10-CM

## 2025-07-06 RX ORDER — GLIMEPIRIDE 2 MG/1
2 TABLET ORAL
Qty: 90 TABLET | Refills: 1 | Status: SHIPPED | OUTPATIENT
Start: 2025-07-06 | End: 2026-07-06

## 2025-07-11 LAB — PSA SERPL-MCNC: 3.52 NG/ML

## 2025-08-20 ENCOUNTER — TELEPHONE (OUTPATIENT)
Facility: CLINIC | Age: 70
End: 2025-08-20
Payer: MEDICARE

## 2025-08-21 ENCOUNTER — APPOINTMENT (OUTPATIENT)
Dept: ENDOCRINOLOGY | Facility: CLINIC | Age: 70
End: 2025-08-21
Payer: MEDICARE

## 2025-08-21 VITALS
HEART RATE: 67 BPM | HEIGHT: 72 IN | DIASTOLIC BLOOD PRESSURE: 87 MMHG | BODY MASS INDEX: 29.91 KG/M2 | SYSTOLIC BLOOD PRESSURE: 129 MMHG | WEIGHT: 220.8 LBS

## 2025-08-21 DIAGNOSIS — E11.9 CONTROLLED TYPE 2 DIABETES MELLITUS WITHOUT COMPLICATION, WITHOUT LONG-TERM CURRENT USE OF INSULIN: ICD-10-CM

## 2025-08-21 DIAGNOSIS — E53.8 B12 DEFICIENCY: ICD-10-CM

## 2025-08-21 DIAGNOSIS — E11.65 TYPE 2 DIABETES MELLITUS WITH HYPERGLYCEMIA, UNSPECIFIED WHETHER LONG TERM INSULIN USE (MULTI): Primary | ICD-10-CM

## 2025-08-21 DIAGNOSIS — E78.00 HIGH CHOLESTEROL: ICD-10-CM

## 2025-08-21 DIAGNOSIS — I10 BENIGN ESSENTIAL HYPERTENSION: ICD-10-CM

## 2025-08-21 DIAGNOSIS — K76.0 FATTY LIVER DISEASE, NONALCOHOLIC: ICD-10-CM

## 2025-08-21 LAB — POC HEMOGLOBIN A1C: 6.4 % (ref 4.2–6.5)

## 2025-08-21 PROCEDURE — 1159F MED LIST DOCD IN RCRD: CPT | Performed by: INTERNAL MEDICINE

## 2025-08-21 PROCEDURE — 4010F ACE/ARB THERAPY RXD/TAKEN: CPT | Performed by: INTERNAL MEDICINE

## 2025-08-21 PROCEDURE — 3008F BODY MASS INDEX DOCD: CPT | Performed by: INTERNAL MEDICINE

## 2025-08-21 PROCEDURE — 3079F DIAST BP 80-89 MM HG: CPT | Performed by: INTERNAL MEDICINE

## 2025-08-21 PROCEDURE — 3074F SYST BP LT 130 MM HG: CPT | Performed by: INTERNAL MEDICINE

## 2025-08-21 PROCEDURE — 3044F HG A1C LEVEL LT 7.0%: CPT | Performed by: INTERNAL MEDICINE

## 2025-08-21 PROCEDURE — 1126F AMNT PAIN NOTED NONE PRSNT: CPT | Performed by: INTERNAL MEDICINE

## 2025-08-21 PROCEDURE — 99214 OFFICE O/P EST MOD 30 MIN: CPT | Performed by: INTERNAL MEDICINE

## 2025-08-21 PROCEDURE — 83036 HEMOGLOBIN GLYCOSYLATED A1C: CPT | Mod: QW | Performed by: INTERNAL MEDICINE

## 2025-08-21 RX ORDER — DAPAGLIFLOZIN 10 MG/1
10 TABLET, FILM COATED ORAL DAILY
Qty: 90 TABLET | Refills: 3 | Status: SHIPPED | OUTPATIENT
Start: 2025-08-21

## 2025-08-21 ASSESSMENT — PAIN SCALES - GENERAL: PAINLEVEL_OUTOF10: 0-NO PAIN

## 2025-08-21 ASSESSMENT — ENCOUNTER SYMPTOMS
LOSS OF SENSATION IN FEET: 0
OCCASIONAL FEELINGS OF UNSTEADINESS: 0
DEPRESSION: 0

## 2025-08-21 ASSESSMENT — PATIENT HEALTH QUESTIONNAIRE - PHQ9
2. FEELING DOWN, DEPRESSED OR HOPELESS: NOT AT ALL
SUM OF ALL RESPONSES TO PHQ9 QUESTIONS 1 & 2: 0
1. LITTLE INTEREST OR PLEASURE IN DOING THINGS: NOT AT ALL

## 2025-10-30 ENCOUNTER — APPOINTMENT (OUTPATIENT)
Dept: UROLOGY | Facility: CLINIC | Age: 70
End: 2025-10-30
Payer: MEDICARE

## 2025-11-13 ENCOUNTER — APPOINTMENT (OUTPATIENT)
Dept: PRIMARY CARE | Facility: CLINIC | Age: 70
End: 2025-11-13
Payer: MEDICARE

## 2025-12-11 ENCOUNTER — APPOINTMENT (OUTPATIENT)
Dept: UROLOGY | Facility: CLINIC | Age: 70
End: 2025-12-11
Payer: MEDICARE

## 2026-03-02 ENCOUNTER — APPOINTMENT (OUTPATIENT)
Facility: CLINIC | Age: 71
End: 2026-03-02
Payer: MEDICARE

## 2026-04-08 ENCOUNTER — APPOINTMENT (OUTPATIENT)
Dept: OPHTHALMOLOGY | Facility: CLINIC | Age: 71
End: 2026-04-08
Payer: MEDICARE